# Patient Record
Sex: FEMALE | Race: OTHER | NOT HISPANIC OR LATINO
[De-identification: names, ages, dates, MRNs, and addresses within clinical notes are randomized per-mention and may not be internally consistent; named-entity substitution may affect disease eponyms.]

---
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
## 2021-01-04 ENCOUNTER — APPOINTMENT (OUTPATIENT)
Dept: OBGYN | Facility: CLINIC | Age: 21
End: 2021-01-04
Payer: MEDICAID

## 2021-01-04 VITALS
WEIGHT: 176 LBS | DIASTOLIC BLOOD PRESSURE: 70 MMHG | HEIGHT: 67 IN | SYSTOLIC BLOOD PRESSURE: 110 MMHG | BODY MASS INDEX: 27.62 KG/M2 | TEMPERATURE: 97 F

## 2021-01-04 DIAGNOSIS — Z78.9 OTHER SPECIFIED HEALTH STATUS: ICD-10-CM

## 2021-01-04 DIAGNOSIS — L68.0 HIRSUTISM: ICD-10-CM

## 2021-01-04 DIAGNOSIS — Z82.49 FAMILY HISTORY OF ISCHEMIC HEART DISEASE AND OTHER DISEASES OF THE CIRCULATORY SYSTEM: ICD-10-CM

## 2021-01-04 DIAGNOSIS — Z01.419 ENCOUNTER FOR GYNECOLOGICAL EXAMINATION (GENERAL) (ROUTINE) W/OUT ABNORMAL FINDINGS: ICD-10-CM

## 2021-01-04 PROCEDURE — 99072 ADDL SUPL MATRL&STAF TM PHE: CPT

## 2021-01-04 PROCEDURE — 36415 COLL VENOUS BLD VENIPUNCTURE: CPT

## 2021-01-04 PROCEDURE — 99212 OFFICE O/P EST SF 10 MIN: CPT | Mod: 25

## 2021-01-04 PROCEDURE — 99385 PREV VISIT NEW AGE 18-39: CPT | Mod: 25

## 2021-01-04 RX ORDER — CLINDAMYCIN PHOSPHATE 1 G/10ML
1 GEL TOPICAL
Qty: 30 | Refills: 0 | Status: COMPLETED | COMMUNITY
Start: 2020-09-25

## 2021-01-04 RX ORDER — OXYCODONE AND ACETAMINOPHEN 5; 325 MG/1; MG/1
5-325 TABLET ORAL
Qty: 20 | Refills: 0 | Status: COMPLETED | COMMUNITY
Start: 2020-07-25

## 2021-01-04 RX ORDER — OMEPRAZOLE 40 MG/1
40 CAPSULE, DELAYED RELEASE ORAL
Qty: 30 | Refills: 0 | Status: COMPLETED | COMMUNITY
Start: 2020-08-25

## 2021-01-04 RX ORDER — FLUTICASONE PROPIONATE 50 UG/1
50 SPRAY, METERED NASAL
Qty: 16 | Refills: 0 | Status: COMPLETED | COMMUNITY
Start: 2020-10-07

## 2021-01-04 RX ORDER — URSODIOL 300 MG/1
300 CAPSULE ORAL
Qty: 60 | Refills: 0 | Status: COMPLETED | COMMUNITY
Start: 2020-07-20

## 2021-01-04 RX ORDER — SILVER SULFADIAZINE 10 MG/G
1 CREAM TOPICAL
Qty: 50 | Refills: 0 | Status: COMPLETED | COMMUNITY
Start: 2020-07-20

## 2021-01-04 RX ORDER — ONDANSETRON 4 MG/1
4 TABLET ORAL
Qty: 12 | Refills: 0 | Status: COMPLETED | COMMUNITY
Start: 2020-08-13

## 2021-01-04 NOTE — DISCUSSION/SUMMARY
[FreeTextEntry1] : Hirsutism: will do BW and call with results.\par We discussed OCP to help with painful periods, acne and hirsutism. She is concerned about weight gain. States gained 20lbs the last time she was on ocp. She will consider ocp but not interested at this time.

## 2021-01-04 NOTE — PHYSICAL EXAM
[Appropriately responsive] : appropriately responsive [Alert] : alert [No Acute Distress] : no acute distress [Soft] : soft [Non-tender] : non-tender [Non-distended] : non-distended [No HSM] : No HSM [No Lesions] : no lesions [No Mass] : no mass [Oriented x3] : oriented x3 [Examination Of The Breasts] : a normal appearance [No Masses] : no breast masses were palpable [Labia Majora] : normal [Labia Minora] : normal [Normal] : normal [Uterine Adnexae] : non-palpable [FreeTextEntry6] : hair on chest [Tenderness] : nontender [Enlarged ___ wks] : not enlarged [Mass ___ cm] : no uterine mass was palpated [FreeTextEntry5] : cx obtained

## 2021-01-04 NOTE — HISTORY OF PRESENT ILLNESS
[Currently Active] : currently active [Men] : men [Vaginal] : vaginal [Condoms] : Condoms [TextBox_4] : last exam 2 yrs ago\par Had gastric sleeve in July, lost 80lbs\par Menses have always been regular, menses have been more painful for past 2-3 months.\par C/o +facial hair. Has been going on for years. Did laser in the past. +acne recently, hair on chest.\par SA, uses condoms 95% of the time.

## 2021-01-07 LAB
C TRACH RRNA SPEC QL NAA+PROBE: NOT DETECTED
N GONORRHOEA RRNA SPEC QL NAA+PROBE: NOT DETECTED
SOURCE AMPLIFICATION: NORMAL

## 2021-01-16 LAB
17OHP SERPL-MCNC: 154 NG/DL
ANDROST SERPL-MCNC: 192 NG/DL
DHEA-SULFATE, SERUM: 411 UG/DL
TESTOST BND SERPL-MCNC: 5.1 PG/ML
TESTOST SERPL-MCNC: 37.1 NG/DL

## 2021-08-03 ENCOUNTER — APPOINTMENT (OUTPATIENT)
Dept: OBGYN | Facility: CLINIC | Age: 21
End: 2021-08-03
Payer: MEDICAID

## 2021-08-03 ENCOUNTER — LABORATORY RESULT (OUTPATIENT)
Age: 21
End: 2021-08-03

## 2021-08-03 VITALS
BODY MASS INDEX: 24.33 KG/M2 | DIASTOLIC BLOOD PRESSURE: 78 MMHG | HEIGHT: 67 IN | SYSTOLIC BLOOD PRESSURE: 110 MMHG | WEIGHT: 155 LBS

## 2021-08-03 PROCEDURE — 99072 ADDL SUPL MATRL&STAF TM PHE: CPT

## 2021-08-03 PROCEDURE — 99213 OFFICE O/P EST LOW 20 MIN: CPT

## 2021-08-03 PROCEDURE — 36415 COLL VENOUS BLD VENIPUNCTURE: CPT

## 2021-08-03 PROCEDURE — 81003 URINALYSIS AUTO W/O SCOPE: CPT | Mod: QW

## 2021-08-03 NOTE — HISTORY OF PRESENT ILLNESS
[FreeTextEntry1] : 20 y/o G0 with LMP: 07/20/21 presents for possible vaginal infection. Visited urgent care recently\par \par CC:\par Pt is having burning & itching. Pt states she went to urgent care & was prescribed Diflucan 150 mg x2. Pt took the Diflucan but is not feeling better, Still has yellow-white discharge. \par Pt is requesting all STD testing. \par \par Menstrual Triad: 11 x 28 x 4-5\par \par Last sexually active Early July. Did not use condoms. Was consensual. \par \par Sx:\par gastric bypass 7/24/2020 - lost 100#

## 2021-08-04 LAB
BILIRUB UR QL STRIP: NORMAL
GLUCOSE UR-MCNC: NORMAL
HCG UR QL: 4 EU/DL
HGB UR QL STRIP.AUTO: NORMAL
KETONES UR-MCNC: NORMAL
LEUKOCYTE ESTERASE UR QL STRIP: NORMAL
NITRITE UR QL STRIP: NORMAL
PH UR STRIP: 7.5
PROT UR STRIP-MCNC: NORMAL
SP GR UR STRIP: 1.03

## 2021-08-05 ENCOUNTER — NON-APPOINTMENT (OUTPATIENT)
Age: 21
End: 2021-08-05

## 2021-08-08 LAB
BACTERIA GENITAL AEROBE CULT: ABNORMAL
C TRACH RRNA SPEC QL NAA+PROBE: NOT DETECTED
CANDIDA VAG CYTO: NOT DETECTED
G VAGINALIS+PREV SP MTYP VAG QL MICRO: DETECTED
HBV SURFACE AG SER QL: NONREACTIVE
HCV AB SER QL: NONREACTIVE
HCV S/CO RATIO: 0.19 S/CO
N GONORRHOEA RRNA SPEC QL NAA+PROBE: NOT DETECTED
RPR SER-TITR: NORMAL
SOURCE AMPLIFICATION: NORMAL
T VAGINALIS VAG QL WET PREP: NOT DETECTED

## 2021-09-30 ENCOUNTER — APPOINTMENT (OUTPATIENT)
Dept: OBGYN | Facility: CLINIC | Age: 21
End: 2021-09-30
Payer: MEDICAID

## 2021-09-30 VITALS
WEIGHT: 155 LBS | TEMPERATURE: 97.2 F | DIASTOLIC BLOOD PRESSURE: 70 MMHG | SYSTOLIC BLOOD PRESSURE: 110 MMHG | BODY MASS INDEX: 24.33 KG/M2 | HEIGHT: 67 IN

## 2021-09-30 DIAGNOSIS — B37.3 CANDIDIASIS OF VULVA AND VAGINA: ICD-10-CM

## 2021-09-30 LAB
BILIRUB UR QL STRIP: NORMAL
CLARITY UR: CLEAR
COLLECTION METHOD: NORMAL
GLUCOSE UR-MCNC: NORMAL
HCG UR QL: 0.2 EU/DL
HCG UR QL: NEGATIVE
HGB UR QL STRIP.AUTO: NORMAL
KETONES UR-MCNC: NORMAL
LEUKOCYTE ESTERASE UR QL STRIP: NORMAL
NITRITE UR QL STRIP: NORMAL
PH UR STRIP: 6
PROT UR STRIP-MCNC: NORMAL
QUALITY CONTROL: YES
SP GR UR STRIP: 1.03

## 2021-09-30 PROCEDURE — 81003 URINALYSIS AUTO W/O SCOPE: CPT | Mod: QW

## 2021-09-30 PROCEDURE — 99072 ADDL SUPL MATRL&STAF TM PHE: CPT

## 2021-09-30 PROCEDURE — 81025 URINE PREGNANCY TEST: CPT

## 2021-09-30 PROCEDURE — 99213 OFFICE O/P EST LOW 20 MIN: CPT

## 2021-09-30 NOTE — HISTORY OF PRESENT ILLNESS
[FreeTextEntry1] : Pt c/o burning after urination, feels like she has a cut, yellow, whitish dc.\par Was given diflucan 1 week ago which helped a little but then symptoms came back.\par No itching, no odor.\par No frequency or urgency.\par 1 week ago SA and did not use a condom. Desires std screen.

## 2021-09-30 NOTE — PHYSICAL EXAM
[Appropriately responsive] : appropriately responsive [Alert] : alert [No Acute Distress] : no acute distress [Labia Majora] : normal [Labia Majora Erythema] : erythema [Labia Minora] : normal [Labia Minora Erythema] : erythema [Erythematous] : erythema [Normal] : normal

## 2021-10-08 LAB
A VAGINAE DNA VAG QL NAA+PROBE: ABNORMAL
BVAB2 DNA VAG QL NAA+PROBE: NORMAL
C KRUSEI DNA VAG QL NAA+PROBE: NEGATIVE
C TRACH RRNA SPEC QL NAA+PROBE: NEGATIVE
MEGA1 DNA VAG QL NAA+PROBE: ABNORMAL
N GONORRHOEA RRNA SPEC QL NAA+PROBE: NEGATIVE
T VAGINALIS RRNA SPEC QL NAA+PROBE: NEGATIVE

## 2021-12-16 ENCOUNTER — NON-APPOINTMENT (OUTPATIENT)
Age: 21
End: 2021-12-16

## 2022-01-04 ENCOUNTER — APPOINTMENT (OUTPATIENT)
Dept: OBGYN | Facility: CLINIC | Age: 22
End: 2022-01-04
Payer: MEDICAID

## 2022-01-04 VITALS
DIASTOLIC BLOOD PRESSURE: 72 MMHG | BODY MASS INDEX: 24.48 KG/M2 | HEIGHT: 67 IN | SYSTOLIC BLOOD PRESSURE: 110 MMHG | RESPIRATION RATE: 18 BRPM | OXYGEN SATURATION: 98 % | WEIGHT: 156 LBS

## 2022-01-04 DIAGNOSIS — N76.0 ACUTE VAGINITIS: ICD-10-CM

## 2022-01-04 PROCEDURE — 99072 ADDL SUPL MATRL&STAF TM PHE: CPT

## 2022-01-04 PROCEDURE — 99214 OFFICE O/P EST MOD 30 MIN: CPT

## 2022-01-04 RX ORDER — CLINDAMYCIN HYDROCHLORIDE 300 MG/1
300 CAPSULE ORAL 3 TIMES DAILY
Qty: 18 | Refills: 0 | Status: DISCONTINUED | COMMUNITY
Start: 2021-08-05 | End: 2022-01-04

## 2022-01-04 RX ORDER — TERCONAZOLE 8 MG/G
0.8 CREAM VAGINAL
Qty: 1 | Refills: 1 | Status: DISCONTINUED | COMMUNITY
Start: 2021-09-30 | End: 2022-01-04

## 2022-01-04 RX ORDER — METRONIDAZOLE 7.5 MG/G
0.75 GEL VAGINAL
Qty: 1 | Refills: 0 | Status: DISCONTINUED | COMMUNITY
Start: 2021-12-16 | End: 2022-01-04

## 2022-01-04 RX ORDER — FLUCONAZOLE 150 MG/1
150 TABLET ORAL
Qty: 2 | Refills: 0 | Status: DISCONTINUED | COMMUNITY
Start: 2021-09-20 | End: 2022-01-04

## 2022-01-04 NOTE — DISCUSSION/SUMMARY
[FreeTextEntry1] : 1) affirm/STD cultures obtained\par 2) patient treated presumptively for yeast. Pt advised this is possibly 2nd to Metrogel therapy\par 3) condom usage advised\par \par Return to office prn

## 2022-01-04 NOTE — PHYSICAL EXAM
The patient is a 22y Male complaining of pain, shoulder. [Appropriately responsive] : appropriately responsive [Alert] : alert [No Acute Distress] : no acute distress [No Lymphadenopathy] : no lymphadenopathy [Oriented x3] : oriented x3 [Labia Majora] : normal [Labia Minora] : normal [Normal] : normal [FreeTextEntry4] : copious yellow curdy discharge

## 2022-01-05 ENCOUNTER — NON-APPOINTMENT (OUTPATIENT)
Age: 22
End: 2022-01-05

## 2022-01-05 LAB
C TRACH RRNA SPEC QL NAA+PROBE: NOT DETECTED
CANDIDA VAG CYTO: DETECTED
G VAGINALIS+PREV SP MTYP VAG QL MICRO: DETECTED
N GONORRHOEA RRNA SPEC QL NAA+PROBE: NOT DETECTED
SOURCE AMPLIFICATION: NORMAL
T VAGINALIS VAG QL WET PREP: NOT DETECTED

## 2022-01-19 ENCOUNTER — NON-APPOINTMENT (OUTPATIENT)
Age: 22
End: 2022-01-19

## 2022-04-26 ENCOUNTER — APPOINTMENT (OUTPATIENT)
Dept: OBGYN | Facility: CLINIC | Age: 22
End: 2022-04-26

## 2022-04-29 ENCOUNTER — APPOINTMENT (OUTPATIENT)
Dept: OBGYN | Facility: CLINIC | Age: 22
End: 2022-04-29

## 2022-05-02 ENCOUNTER — APPOINTMENT (OUTPATIENT)
Dept: OBGYN | Facility: CLINIC | Age: 22
End: 2022-05-02
Payer: MEDICAID

## 2022-05-02 ENCOUNTER — TRANSCRIPTION ENCOUNTER (OUTPATIENT)
Age: 22
End: 2022-05-02

## 2022-05-02 VITALS — DIASTOLIC BLOOD PRESSURE: 70 MMHG | SYSTOLIC BLOOD PRESSURE: 90 MMHG

## 2022-05-02 DIAGNOSIS — Z01.411 ENCOUNTER FOR GYNECOLOGICAL EXAMINATION (GENERAL) (ROUTINE) WITH ABNORMAL FINDINGS: ICD-10-CM

## 2022-05-02 DIAGNOSIS — Z23 ENCOUNTER FOR IMMUNIZATION: ICD-10-CM

## 2022-05-02 DIAGNOSIS — B96.89 ACUTE VAGINITIS: ICD-10-CM

## 2022-05-02 DIAGNOSIS — N76.0 ACUTE VAGINITIS: ICD-10-CM

## 2022-05-02 DIAGNOSIS — Z72.51 HIGH RISK HETEROSEXUAL BEHAVIOR: ICD-10-CM

## 2022-05-02 DIAGNOSIS — Z11.3 ENCOUNTER FOR SCREENING FOR INFECTIONS WITH A PREDOMINANTLY SEXUAL MODE OF TRANSMISSION: ICD-10-CM

## 2022-05-02 DIAGNOSIS — Z87.898 PERSONAL HISTORY OF OTHER SPECIFIED CONDITIONS: ICD-10-CM

## 2022-05-02 DIAGNOSIS — R39.9 UNSPECIFIED SYMPTOMS AND SIGNS INVOLVING THE GENITOURINARY SYSTEM: ICD-10-CM

## 2022-05-02 LAB
HCG UR QL: NEGATIVE
QUALITY CONTROL: YES

## 2022-05-02 PROCEDURE — 99395 PREV VISIT EST AGE 18-39: CPT

## 2022-05-02 PROCEDURE — 81025 URINE PREGNANCY TEST: CPT

## 2022-05-02 RX ORDER — METRONIDAZOLE 500 MG/1
500 TABLET ORAL TWICE DAILY
Qty: 14 | Refills: 0 | Status: DISCONTINUED | COMMUNITY
Start: 2022-01-05 | End: 2022-05-02

## 2022-05-02 RX ORDER — FLUCONAZOLE 150 MG/1
150 TABLET ORAL
Qty: 2 | Refills: 0 | Status: DISCONTINUED | COMMUNITY
Start: 2022-01-04 | End: 2022-05-02

## 2022-05-02 RX ORDER — CLOTRIMAZOLE AND BETAMETHASONE DIPROPIONATE 10; .5 MG/G; MG/G
1-0.05 CREAM TOPICAL TWICE DAILY
Qty: 1 | Refills: 0 | Status: DISCONTINUED | COMMUNITY
Start: 2022-01-04 | End: 2022-05-02

## 2022-05-02 RX ORDER — METRONIDAZOLE 7.5 MG/G
0.75 GEL VAGINAL
Qty: 1 | Refills: 0 | Status: DISCONTINUED | COMMUNITY
Start: 2022-01-19 | End: 2022-05-02

## 2022-05-02 NOTE — DISCUSSION/SUMMARY
[FreeTextEntry1] : 1) pap/STD cultures obtained\par 2) pt with no contraindications for OCP usage. risks/benefits/side effects reviewed along with optimal initiation and usage. pt advised to take a pregnancy test if menses is very short or spotting only or anything out of the usual - prior to starting OCPS. in-office pregnancy test is negative today but this may be too early.\par 3) pt advised to obtain Gardasil vaccine if not already received. written literature provided\par 4) consistent condom usage advised\par \par Return to office for surveillance of OCPS in 6 months, sooner prn\par

## 2022-05-02 NOTE — HISTORY OF PRESENT ILLNESS
[Currently Active] : currently active [Men] : men [No] : No [TextBox_4] : Oksana is a 23 y/o G0 who presents today for an annual exam with request for STD testing. She also desires to start hormonal contraception for relief of heavy, painful menses. She is currently sexually active and not using any method of contraception.  She is concerned about weight gain as she is s/p gastric sleeve surgery.\par \par She is unsure if she ever had the Gardasil vaccine.   [FreeTextEntry1] : 4/10/22 [FreeTextEntry4] : She is unsure if she ever had the Gardasil vaccine

## 2022-05-03 LAB
C TRACH RRNA SPEC QL NAA+PROBE: NOT DETECTED
HAV IGM SER QL: NONREACTIVE
HBV CORE IGM SER QL: NONREACTIVE
HBV SURFACE AG SER QL: NONREACTIVE
HCV AB SER QL: NONREACTIVE
HCV S/CO RATIO: 0.17 S/CO
HIV1+2 AB SPEC QL IA.RAPID: NONREACTIVE
N GONORRHOEA RRNA SPEC QL NAA+PROBE: NOT DETECTED
SOURCE AMPLIFICATION: NORMAL
SOURCE TP AMPLIFICATION: NORMAL
T PALLIDUM AB SER QL IA: NEGATIVE
T VAGINALIS RRNA SPEC QL NAA+PROBE: NOT DETECTED

## 2022-05-04 ENCOUNTER — NON-APPOINTMENT (OUTPATIENT)
Age: 22
End: 2022-05-04

## 2022-05-04 LAB
HSV 1+2 IGG SER IA-IMP: NEGATIVE
HSV 1+2 IGG SER IA-IMP: POSITIVE
HSV1 IGG SER QL: 49.9 INDEX
HSV2 IGG SER QL: 0.45 INDEX

## 2022-05-12 ENCOUNTER — NON-APPOINTMENT (OUTPATIENT)
Age: 22
End: 2022-05-12

## 2022-08-23 ENCOUNTER — APPOINTMENT (OUTPATIENT)
Dept: ORTHOPEDIC SURGERY | Facility: CLINIC | Age: 22
End: 2022-08-23

## 2022-08-23 VITALS — BODY MASS INDEX: 24.48 KG/M2 | WEIGHT: 156 LBS | HEIGHT: 67 IN

## 2022-08-23 PROCEDURE — 72040 X-RAY EXAM NECK SPINE 2-3 VW: CPT

## 2022-08-23 PROCEDURE — 99204 OFFICE O/P NEW MOD 45 MIN: CPT

## 2022-08-31 ENCOUNTER — NON-APPOINTMENT (OUTPATIENT)
Age: 22
End: 2022-08-31

## 2022-09-20 ENCOUNTER — APPOINTMENT (OUTPATIENT)
Dept: OBGYN | Facility: CLINIC | Age: 22
End: 2022-09-20

## 2022-10-03 ENCOUNTER — APPOINTMENT (OUTPATIENT)
Dept: OBGYN | Facility: CLINIC | Age: 22
End: 2022-10-03

## 2022-10-03 VITALS
SYSTOLIC BLOOD PRESSURE: 110 MMHG | DIASTOLIC BLOOD PRESSURE: 76 MMHG | HEIGHT: 67 IN | WEIGHT: 156 LBS | BODY MASS INDEX: 24.48 KG/M2

## 2022-10-03 LAB
BILIRUB UR QL STRIP: NORMAL
CLARITY UR: NORMAL
COLLECTION METHOD: NORMAL
GLUCOSE UR-MCNC: NORMAL
HCG UR QL: 0.2 EU/DL
HGB UR QL STRIP.AUTO: NORMAL
KETONES UR-MCNC: NORMAL
LEUKOCYTE ESTERASE UR QL STRIP: NORMAL
NITRITE UR QL STRIP: NORMAL
PH UR STRIP: 7.5
PROT UR STRIP-MCNC: NORMAL
SP GR UR STRIP: 1.02

## 2022-10-03 PROCEDURE — 99213 OFFICE O/P EST LOW 20 MIN: CPT

## 2022-10-03 PROCEDURE — 81003 URINALYSIS AUTO W/O SCOPE: CPT | Mod: QW

## 2022-10-03 NOTE — HISTORY OF PRESENT ILLNESS
[FreeTextEntry1] : pt had annual with pap 5/2022, on ocp now\par \par \par C/o itching and burning in vagina since last night, whitish yellow dc, odor, states slightly fishy for past month\par no urinary symptoms\par No recent abx

## 2022-10-03 NOTE — PHYSICAL EXAM
[Appropriately responsive] : appropriately responsive [Alert] : alert [No Acute Distress] : no acute distress [Oriented x3] : oriented x3 [Labia Majora] : normal [Labia Majora Erythema] : erythema [Labia Minora] : normal [Labia Minora Erythema] : erythema [Erythematous] : erythema [Normal] : normal [FreeTextEntry4] : cx done

## 2022-10-06 ENCOUNTER — TRANSCRIPTION ENCOUNTER (OUTPATIENT)
Age: 22
End: 2022-10-06

## 2022-10-06 LAB
CANDIDA VAG CYTO: NOT DETECTED
G VAGINALIS+PREV SP MTYP VAG QL MICRO: DETECTED
T VAGINALIS VAG QL WET PREP: NOT DETECTED

## 2022-10-28 ENCOUNTER — NON-APPOINTMENT (OUTPATIENT)
Age: 22
End: 2022-10-28

## 2022-11-27 ENCOUNTER — NON-APPOINTMENT (OUTPATIENT)
Age: 22
End: 2022-11-27

## 2023-05-15 ENCOUNTER — NON-APPOINTMENT (OUTPATIENT)
Age: 23
End: 2023-05-15

## 2023-06-21 ENCOUNTER — APPOINTMENT (OUTPATIENT)
Dept: OBGYN | Facility: CLINIC | Age: 23
End: 2023-06-21
Payer: MEDICAID

## 2023-06-21 VITALS
WEIGHT: 156 LBS | HEIGHT: 67 IN | DIASTOLIC BLOOD PRESSURE: 70 MMHG | BODY MASS INDEX: 24.48 KG/M2 | SYSTOLIC BLOOD PRESSURE: 110 MMHG

## 2023-06-21 DIAGNOSIS — Z87.42 PERSONAL HISTORY OF OTHER DISEASES OF THE FEMALE GENITAL TRACT: ICD-10-CM

## 2023-06-21 DIAGNOSIS — Z12.4 ENCOUNTER FOR SCREENING FOR MALIGNANT NEOPLASM OF CERVIX: ICD-10-CM

## 2023-06-21 DIAGNOSIS — Z30.09 ENCOUNTER FOR OTHER GENERAL COUNSELING AND ADVICE ON CONTRACEPTION: ICD-10-CM

## 2023-06-21 DIAGNOSIS — N92.0 EXCESSIVE AND FREQUENT MENSTRUATION WITH REGULAR CYCLE: ICD-10-CM

## 2023-06-21 DIAGNOSIS — G43.909 MIGRAINE, UNSPECIFIED, NOT INTRACTABLE, W/OUT STATUS MIGRAINOSUS: ICD-10-CM

## 2023-06-21 DIAGNOSIS — Z01.419 ENCOUNTER FOR GYNECOLOGICAL EXAMINATION (GENERAL) (ROUTINE) W/OUT ABNORMAL FINDINGS: ICD-10-CM

## 2023-06-21 DIAGNOSIS — J32.9 CHRONIC SINUSITIS, UNSPECIFIED: ICD-10-CM

## 2023-06-21 LAB — HCG UR QL: NEGATIVE

## 2023-06-21 PROCEDURE — 81025 URINE PREGNANCY TEST: CPT

## 2023-06-21 PROCEDURE — 99395 PREV VISIT EST AGE 18-39: CPT

## 2023-06-21 RX ORDER — METRONIDAZOLE 7.5 MG/G
0.75 GEL VAGINAL
Qty: 1 | Refills: 0 | Status: DISCONTINUED | COMMUNITY
Start: 2022-10-06 | End: 2023-06-21

## 2023-06-21 RX ORDER — NORETHINDRONE ACETATE AND ETHINYL ESTRADIOL AND FERROUS FUMARATE 1MG-20(21)
1-20 KIT ORAL DAILY
Qty: 3 | Refills: 3 | Status: DISCONTINUED | COMMUNITY
Start: 2022-05-02 | End: 2023-06-21

## 2023-06-21 RX ORDER — IBUPROFEN 400 MG/1
400 TABLET, FILM COATED ORAL
Qty: 28 | Refills: 0 | Status: DISCONTINUED | COMMUNITY
Start: 2022-08-17 | End: 2023-06-21

## 2023-06-21 RX ORDER — CYCLOBENZAPRINE HYDROCHLORIDE 5 MG/1
5 TABLET, FILM COATED ORAL
Qty: 90 | Refills: 1 | Status: DISCONTINUED | COMMUNITY
Start: 2022-08-23 | End: 2023-06-21

## 2023-06-21 RX ORDER — CYCLOBENZAPRINE HYDROCHLORIDE 10 MG/1
10 TABLET, FILM COATED ORAL
Qty: 9 | Refills: 0 | Status: DISCONTINUED | COMMUNITY
Start: 2022-08-17 | End: 2023-06-21

## 2023-06-21 RX ORDER — GABAPENTIN 100 MG/1
100 CAPSULE ORAL TWICE DAILY
Qty: 60 | Refills: 0 | Status: DISCONTINUED | COMMUNITY
Start: 2022-08-23 | End: 2023-06-21

## 2023-06-21 RX ORDER — FLUCONAZOLE 150 MG/1
150 TABLET ORAL
Qty: 2 | Refills: 0 | Status: DISCONTINUED | COMMUNITY
Start: 2022-10-03 | End: 2023-06-21

## 2023-06-21 NOTE — HISTORY OF PRESENT ILLNESS
[Previously active] : previously active [Men] : men [TextBox_4] : Oksana is a 22 y/o G0 who presents today for an annual exam.\par \par She states she had restarted her OCPs 4/16/23, had a menses 5/3/23 but nothing since and she did not skip the placebo pills.  She was previously sexually active but not currently. No condom usage. Her in-office pregnancy test is negative today.\par \par She states that since gastric sleeve surgery 3 years ago menses have been very painful.  She also states that for the past two months she has been having migraines everyday. She does not recall having them with prior usage of OCPS.  She denies any visual changes prior.\par Her 2022 pap was LSIL. [FreeTextEntry1] : 5/3/23 [FreeTextEntry3] : OCPs intermittently

## 2023-06-21 NOTE — DISCUSSION/SUMMARY
[FreeTextEntry1] : 1) pap/std cultures performed\par 2) pt with consistent migraines since re-starting combined hormonal contraception. Discussed trialing progesterone only pills to see if this stops migraines. Advised to complete remaining 4 days of current pack and then to start Norethindrone. If migrained persist, pt advised to f/u with neurologist\par \par Return to office in 6 months for POP surveillance, sooner prn

## 2023-06-22 LAB
C TRACH RRNA SPEC QL NAA+PROBE: NOT DETECTED
N GONORRHOEA RRNA SPEC QL NAA+PROBE: NOT DETECTED
SOURCE TP AMPLIFICATION: NORMAL

## 2023-06-23 LAB — CYTOLOGY CVX/VAG DOC THIN PREP: NORMAL

## 2023-07-26 ENCOUNTER — APPOINTMENT (OUTPATIENT)
Dept: ORTHOPEDIC SURGERY | Facility: CLINIC | Age: 23
End: 2023-07-26
Payer: MEDICAID

## 2023-07-26 VITALS — HEIGHT: 67 IN | WEIGHT: 157 LBS | BODY MASS INDEX: 24.64 KG/M2

## 2023-07-26 DIAGNOSIS — S13.9XXA SPRAIN OF JOINTS AND LIGAMENTS OF UNSPECIFIED PARTS OF NECK, INITIAL ENCOUNTER: ICD-10-CM

## 2023-07-26 PROCEDURE — 99215 OFFICE O/P EST HI 40 MIN: CPT

## 2023-07-26 NOTE — PHYSICAL EXAM
[de-identified] : She is fully alert and oriented with a normal mood and affect.  She is in no acute distress as I take the history.  She ambulates with a normal gait including tiptoe and heel walking.  There is no evidence of unsteadiness or spasticity.  There are no cutaneous abnormalities of the spine.  There is a mild shoulder height discrepancy from a mild scoliosis.  There is no evidence of shortness of breath or respiratory distress.  A lower extremity neurological examination revealed 1-2+ symmetrical reflexes.  Toes are downgoing.  Motor power is normal to manual testing in all lower extremity groups and sensation is normal to light touch in all dermatomes.  Straight leg raising is negative to 90 degrees in the sitting position bilaterally.  Her hips and her knees have a full and painless range of motion with normal stability.  Vascular examination shows no evidence of varicosities there is no lymphedema.  Her upper extremity neurological examination revealed 1+ symmetrical reflexes.  Motor power is normal to manual testing in all upper extremity groups and sensation is normal to light touch in all dermatomes.  Shoulder range of motion is full, painless and symmetrical.  Range of motion of the cervical spine is mildly limited.  She has flexion with the chin reaching to within 3 fingerbreadths of the chest, extension of 70 degrees with rotation of 70 degrees bilaterally and tilt of 30 degrees bilaterally with some discomfort at the extremes. [de-identified] : I reviewed her x-rays of last year that show a straightening of the normal cervical lordosis without destructive changes.

## 2023-07-26 NOTE — DISCUSSION/SUMMARY
[Medication Risks Reviewed] : Medication risks reviewed [de-identified] : She will use moist heat and refrain from any upper extremity weight lifting activities.  We discussed other daily activities she needs to avoid which may aggravate the symptoms.  She will start on a trial of meloxicam 15 mg once a day as a has a much lower incidence of GI side effects.  She will call if there are problems with the medication or worsening of her symptoms and I will see her for follow-up in 4 weeks.

## 2023-07-26 NOTE — HISTORY OF PRESENT ILLNESS
[de-identified] : This 23-year-old woman who works as a  saw Dr. Das last August for neck related symptoms.  She relates today that the neck symptoms have persisted with radiation into the upper back and associated with headaches.  The pain does not radiate to her heart her arms and she has not had associated neurologic symptoms in the arms of numbness, paresthesias or weakness.  There is no Valsalva effect.  She does get occasional night pain.  There have been no changes in her gait or balance and she has not had problems with hand function.  She does have some seemingly innocent daily habits that may well aggravate and propagate neck related pain.\par \par She has previously had a T&A.  3 years ago she had a gastric sleeve procedure and she has lost 120 pounds.  She has also had surgery for a pilonidal cyst.  She has been on omeprazole since her gastric sleeve procedure and has not had any gastric symptoms.  She has a penicillin allergy.  She does exercise regularly. [Pain Location] : pain [7] : a maximum pain level of 7/10

## 2023-08-07 ENCOUNTER — APPOINTMENT (OUTPATIENT)
Dept: ORTHOPEDIC SURGERY | Facility: CLINIC | Age: 23
End: 2023-08-07

## 2023-09-06 ENCOUNTER — APPOINTMENT (OUTPATIENT)
Dept: ORTHOPEDIC SURGERY | Facility: CLINIC | Age: 23
End: 2023-09-06

## 2023-09-13 ENCOUNTER — APPOINTMENT (OUTPATIENT)
Dept: OBGYN | Facility: CLINIC | Age: 23
End: 2023-09-13
Payer: MEDICAID

## 2023-09-13 VITALS
DIASTOLIC BLOOD PRESSURE: 70 MMHG | WEIGHT: 162 LBS | SYSTOLIC BLOOD PRESSURE: 120 MMHG | BODY MASS INDEX: 25.43 KG/M2 | HEIGHT: 67 IN

## 2023-09-13 DIAGNOSIS — A60.00 HERPESVIRAL INFECTION OF UROGENITAL SYSTEM, UNSPECIFIED: ICD-10-CM

## 2023-09-13 PROCEDURE — 99214 OFFICE O/P EST MOD 30 MIN: CPT

## 2023-09-15 ENCOUNTER — EMERGENCY (EMERGENCY)
Facility: HOSPITAL | Age: 23
LOS: 0 days | Discharge: HOME | End: 2023-09-15
Attending: STUDENT IN AN ORGANIZED HEALTH CARE EDUCATION/TRAINING PROGRAM
Payer: OTHER MISCELLANEOUS

## 2023-09-15 ENCOUNTER — NON-APPOINTMENT (OUTPATIENT)
Age: 23
End: 2023-09-15

## 2023-09-15 VITALS
DIASTOLIC BLOOD PRESSURE: 81 MMHG | WEIGHT: 163.58 LBS | RESPIRATION RATE: 16 BRPM | SYSTOLIC BLOOD PRESSURE: 112 MMHG | OXYGEN SATURATION: 100 % | HEART RATE: 58 BPM | TEMPERATURE: 98 F

## 2023-09-15 VITALS — HEIGHT: 67 IN | WEIGHT: 164.91 LBS

## 2023-09-15 DIAGNOSIS — Z90.89 ACQUIRED ABSENCE OF OTHER ORGANS: Chronic | ICD-10-CM
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
## 2023-09-15 DIAGNOSIS — S01.85XA OPEN BITE OF OTHER PART OF HEAD, INITIAL ENCOUNTER: ICD-10-CM
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
## 2023-09-15 DIAGNOSIS — Z88.0 ALLERGY STATUS TO PENICILLIN: ICD-10-CM

## 2023-09-15 LAB
C TRACH RRNA SPEC QL NAA+PROBE: NOT DETECTED
CANDIDA VAG CYTO: NOT DETECTED
G VAGINALIS+PREV SP MTYP VAG QL MICRO: DETECTED
N GONORRHOEA RRNA SPEC QL NAA+PROBE: NOT DETECTED
SOURCE AMPLIFICATION: NORMAL
T VAGINALIS VAG QL WET PREP: NOT DETECTED

## 2023-09-15 PROCEDURE — 99284 EMERGENCY DEPT VISIT MOD MDM: CPT

## 2023-09-15 PROCEDURE — 99283 EMERGENCY DEPT VISIT LOW MDM: CPT

## 2023-09-15 RX ORDER — METRONIDAZOLE 500 MG
1 TABLET ORAL
Qty: 21 | Refills: 0
Start: 2023-09-15 | End: 2023-09-21

## 2023-09-15 RX ORDER — HUMAN RABIES VIRUS IMMUNE GLOBULIN 150 [IU]/ML
1500 INJECTION, SOLUTION INTRAMUSCULAR ONCE
Refills: 0 | Status: DISCONTINUED | OUTPATIENT
Start: 2023-09-15 | End: 2023-09-15

## 2023-09-15 RX ORDER — RABIES VACC, HUMAN DIPLOID/PF 2.5 UNIT
1 VIAL (EA) INTRAMUSCULAR ONCE
Refills: 0 | Status: DISCONTINUED | OUTPATIENT
Start: 2023-09-15 | End: 2023-09-15

## 2023-09-15 RX ADMIN — Medication 1 TABLET(S): at 15:20

## 2023-09-15 NOTE — ED STATDOCS - NS ED ATTENDING STATEMENT MOD
This was a shared visit with the TADEO. I reviewed and verified the documentation and independently performed the documented:

## 2023-09-15 NOTE — ED STATDOCS - OBJECTIVE STATEMENT
24 y/o female with no pertinent PMHx presents to ED s/p dog bite to left jaw. 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 Reports dog latched onto her. Was sent by urgent care for further evaluation. Tetanus is UTD. Allergies: Penicillin.

## 2023-09-15 NOTE — ED STATDOCS - NS_EDPROVIDERDISPOUSERTYPE_ED_A_ED
Scribe Attestation (For Scribes USE Only)... Attending Attestation (For Attendings USE Only).../Scribe Attestation (For Scribes USE Only)... 20

## 2023-09-15 NOTE — ED STATDOCS - PHYSICAL EXAMINATION
GENERAL: non-toxic appearing, in NAD  HEAD: atraumatic, normocephalic  EYES: vision grossly intact, no conjunctivitis or discharge  EARS: hearing grossly intact  NOSE: no nasal discharge, epistaxis   CARDIAC: RRR, normal S1S2,  no appreciable murmurs, no cyanosis, cap refill < 2 seconds  PULM: no respiratory distress, oxygen saturation on RA wnl, CTAB, no crackles, rales, rhonchi, or wheezing  GI: abdomen nondistended, soft, nontender, no guarding or rebound tenderness, no palpable masses  NEURO: awake and alert, follows commands, normal speech, PERRLA, EOMI, no focal motor or sensory deficits, normal gait  MSK: spine appears normal, no joint swelling or erythema, no gross deformities of extremities  EXT: no peripheral edema, calf tenderness, redness or swelling  SKIN: +2 puncture wounds to left lower jaw   PSYCH: appropriate mood and affect

## 2023-09-15 NOTE — ED STATDOCS - NSFOLLOWUPINSTRUCTIONS_ED_ALL_ED_FT
Take the antibiotics as directed.   Keep the wound clean with soap and water.   Return to the Emergency Department for worsening or persistent symptoms, and/or ANY NEW OR CONCERNING SYMPTOMS. If you have issues obtaining follow up, please call: [phone] or [phone]  to obtain a doctor or specialist who takes your insurance in your area.        Animal Bite, Adult  Animal bites range from mild to serious. An animal bite can result in any of these injuries:  A scratch.  A deep, open cut.  Broken (punctured) or torn skin.  A crush injury.  A bone injury.  A small bite from a house pet is usually less serious than a bite from a stray or wild animal. Cat bites can be more serious because their long, thin teeth can cause deep puncture wounds that close fast, trapping bacteria inside.    Stray or wild animals, such as a raccoon, frausto, skunk, or bat, are at higher risk of carrying a serious infection called rabies, which they can pass to a human through a bite. A bite from one of these animals needs medical care right away and, sometimes, rabies vaccination.    What increases the risk?  You are more likely to be bitten by an animal if:  You are around unfamiliar pets.  You disturb an animal when it is eating, sleeping, or caring for its babies.  You are outdoors in a place where small, wild animals roam freely.  What are the signs or symptoms?  Common symptoms of an animal bite include:  Pain.  Bleeding.  Swelling.  Bruising.  How is this diagnosed?  This condition may be diagnosed based on a physical exam and medical history. Your health care provider will examine your wound and ask for details about the animal and how the bite happened. You may also have tests, such as:  Blood tests to check for infection.  X-rays to check for damage to bones or joints.  Taking a fluid sample from your wound and checking it for infection (culture test).  How is this treated?  Treatment depends on the type of animal, where the bite is on your body, and your medical history. Treatment may include:  Wound care. This often includes cleaning the wound and rinsing it out (flushing it) with saline solution, which is made of salt and water. A bandage (dressing) is also often applied. In rare cases, the wound may be closed with stitches (sutures), staples, skin glue, or adhesive strips.  Antibiotic medicine to prevent or treat infection. This medicine may be prescribed in pill or ointment form. If the bite area gets infected, the medicine may be given through an IV.  A tetanus shot to prevent tetanus infection.  Rabies treatment to prevent rabies infection, if the animal could have rabies.  Surgery. This may be done if a bite gets infected or causes damage that needs to be repaired.  Follow these instructions at home:  Medicines    Take or apply over-the-counter and prescription medicines only as told by your health care provider.  If you were prescribed an antibiotic medicine, take or apply it as told by your health care provider. Do not stop using the antibiotic even if you start to feel better.  Wound care    Two stitched wounds. One is normal. The other is red with pus and infected.  Follow instructions from your health care provider about how to take care of your wound. Make sure you:  Wash your hands with soap and water for at least 20 seconds before and after you change your dressing. If soap and water are not available, use hand .  Change your dressing as told by your health care provider.  Leave sutures, skin glue, or adhesive strips in place. These skin closures may need to stay in place for 2 weeks or longer. If adhesive strip edges start to loosen and curl up, you may trim the loose edges. Do not remove adhesive strips completely unless your health care provider tells you to do that.  Check your wound every day for signs of infection. Check for:  More redness, swelling, or pain.  More fluid or blood.  Warmth.  Pus or a bad smell.  General instructions    Bag of ice on a towel on the skin.   Raise (elevate) the injured area above the level of your heart while you are sitting or lying down, if this is possible.  If directed, put ice on the injured area. To do this:  Put ice in a plastic bag.  Place a towel between your skin and the bag.  Leave the ice on for 20 minutes, 2–3 times per day.  Remove the ice if your skin turns bright red. This is very important. If you cannot feel pain, heat, or cold, you have a greater risk of damage to the area.  Keep all follow-up visits. This is important.  Contact a health care provider if:  You have more redness, swelling, or pain around your wound.  Your wound feels warm to the touch.  You have a fever or chills.  You have a general feeling of sickness (malaise).  You feel nauseous or you vomit.  You have pain that does not get better.  Get help right away if:  You have a red streak that leads away from your wound.  You have non-clear fluid or more blood coming from your wound.  There is pus or a bad smell coming from your wound.  You have trouble moving your injured area.  You have numbness or tingling that spreads beyond your wound.  Summary  Animal bites can range from mild to serious. An animal bite can cause a scratch on the skin, a deep and open cut, torn or punctured skin, a crush injury, or a bone injury.  A bite from a stray or wild animal needs medical care right away and, sometimes, rabies vaccination.  Your health care provider will examine your wound and ask for details about the animal and how the bite happened.  Treatment may include wound care, antibiotic medicine, a tetanus shot, and rabies treatment if the animal could have rabies.

## 2023-09-15 NOTE — ED STATDOCS - CLINICAL SUMMARY MEDICAL DECISION MAKING FREE TEXT BOX
History and physical as documented above.  No indication for laceration repair. 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

## 2023-09-15 NOTE — ED STATDOCS - DISPOSITION TYPE
Outreach call placed to patient's caregivers to coordinate a return visit with pre-visit lab draw. A voicemail was left with a request for a return call.    pain, rectal DISCHARGE

## 2023-09-15 NOTE — ED STATDOCS - PATIENT PORTAL LINK FT
You can access the FollowMyHealth Patient Portal offered by [facility] by registering at the following website: [email or web address]. By joining [facility] FollowMyHealth portal, you will also be able to view your health information using other applications (apps) compatible with our system.

## 2023-09-15 NOTE — ED STATDOCS - PROGRESS NOTE DETAILS
Initially spoke with Rebekah from [facility] who said since the vaccine of the dog was being updated then the pt should get it.   Received a call from Fatmata from [facility] who said that they don't go based on vaccine status and that as long as the dog can be observed then she does not need the rabies series or vaccines. Pt was made aware of the plan and agrees. 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 Cleaned wond with betadine ad applied bacitracin. -Mekhi Pickens PA-C 24 yo female presents with L jaw dog bite. Pt works at [employer] and was hold a dog that was getting his yearly rabies vaccine, who jumped and bit the pt. Pt's vaccines are UTD.   2 puncture wound to the L jaw. No need for repair wince wound are small and to prvent infection. Will reach out to the [facility] for possible rabies vaccines if needed. -Mekhi Pickens PA-C

## 2023-09-15 NOTE — ED ADULT TRIAGE NOTE - CHIEF COMPLAINT QUOTE
patient c/o dog bite to left jaw.  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  sent by urgent care.  patients last tetanus shot was a few months ago.

## 2023-10-04 ENCOUNTER — APPOINTMENT (OUTPATIENT)
Dept: ORTHOPEDIC SURGERY | Facility: CLINIC | Age: 23
End: 2023-10-04
Payer: MEDICAID

## 2023-10-04 ENCOUNTER — NON-APPOINTMENT (OUTPATIENT)
Age: 23
End: 2023-10-04

## 2023-10-04 VITALS
WEIGHT: 162 LBS | HEIGHT: 67 IN | DIASTOLIC BLOOD PRESSURE: 74 MMHG | HEART RATE: 66 BPM | BODY MASS INDEX: 25.43 KG/M2 | SYSTOLIC BLOOD PRESSURE: 111 MMHG

## 2023-10-04 DIAGNOSIS — M25.531 PAIN IN RIGHT WRIST: ICD-10-CM

## 2023-10-04 PROCEDURE — 99214 OFFICE O/P EST MOD 30 MIN: CPT

## 2023-10-04 PROCEDURE — 73110 X-RAY EXAM OF WRIST: CPT | Mod: RT

## 2023-10-11 ENCOUNTER — APPOINTMENT (OUTPATIENT)
Dept: MRI IMAGING | Facility: CLINIC | Age: 23
End: 2023-10-11
Payer: MEDICAID

## 2023-10-11 PROCEDURE — 73221 MRI JOINT UPR EXTREM W/O DYE: CPT | Mod: RT

## 2023-10-30 ENCOUNTER — APPOINTMENT (OUTPATIENT)
Dept: ORTHOPEDIC SURGERY | Facility: CLINIC | Age: 23
End: 2023-10-30
Payer: MEDICAID

## 2023-10-30 PROCEDURE — 20606 DRAIN/INJ JOINT/BURSA W/US: CPT | Mod: RT

## 2023-10-30 PROCEDURE — 99214 OFFICE O/P EST MOD 30 MIN: CPT | Mod: 25

## 2023-10-30 RX ORDER — BETAMETHA AC,SOD PHOS/WATER/PF 6 MG/ML
6 (3-3) VIAL (ML) INJECTION
Qty: 1 | Refills: 0 | Status: COMPLETED | OUTPATIENT
Start: 2023-10-30

## 2023-10-30 RX ORDER — LIDOCAINE HYDROCHLORIDE 10 MG/ML
1 INJECTION, SOLUTION INFILTRATION; PERINEURAL
Refills: 0 | Status: COMPLETED | OUTPATIENT
Start: 2023-10-30

## 2023-10-30 RX ADMIN — Medication MG/ML: at 00:00

## 2023-10-30 RX ADMIN — Medication %: at 00:00

## 2023-11-10 ENCOUNTER — NON-APPOINTMENT (OUTPATIENT)
Age: 23
End: 2023-11-10

## 2023-11-20 ENCOUNTER — APPOINTMENT (OUTPATIENT)
Dept: ORTHOPEDIC SURGERY | Facility: CLINIC | Age: 23
End: 2023-11-20
Payer: MEDICAID

## 2023-11-20 PROCEDURE — 99213 OFFICE O/P EST LOW 20 MIN: CPT

## 2023-11-30 ENCOUNTER — APPOINTMENT (OUTPATIENT)
Dept: OBGYN | Facility: CLINIC | Age: 23
End: 2023-11-30

## 2023-12-03 ENCOUNTER — RX RENEWAL (OUTPATIENT)
Age: 23
End: 2023-12-03

## 2023-12-06 ENCOUNTER — APPOINTMENT (OUTPATIENT)
Dept: OBGYN | Facility: CLINIC | Age: 23
End: 2023-12-06
Payer: MEDICAID

## 2023-12-06 VITALS
DIASTOLIC BLOOD PRESSURE: 70 MMHG | WEIGHT: 160 LBS | HEIGHT: 67 IN | BODY MASS INDEX: 25.11 KG/M2 | SYSTOLIC BLOOD PRESSURE: 110 MMHG

## 2023-12-06 DIAGNOSIS — Z30.09 ENCOUNTER FOR OTHER GENERAL COUNSELING AND ADVICE ON CONTRACEPTION: ICD-10-CM

## 2023-12-06 DIAGNOSIS — N92.1 EXCESSIVE AND FREQUENT MENSTRUATION WITH IRREGULAR CYCLE: ICD-10-CM

## 2023-12-06 LAB — HCG UR QL: NEGATIVE

## 2023-12-06 PROCEDURE — 99213 OFFICE O/P EST LOW 20 MIN: CPT

## 2023-12-06 PROCEDURE — 81025 URINE PREGNANCY TEST: CPT

## 2023-12-06 RX ORDER — DOXYCLYCLINE HYCLATE 150 MG/1
TABLET, COATED ORAL
Refills: 0 | Status: DISCONTINUED | COMMUNITY
End: 2023-12-06

## 2023-12-06 RX ORDER — METRONIDAZOLE 7.5 MG/G
0.75 GEL VAGINAL
Qty: 1 | Refills: 0 | Status: DISCONTINUED | COMMUNITY
Start: 2023-09-15 | End: 2023-12-06

## 2023-12-08 ENCOUNTER — TRANSCRIPTION ENCOUNTER (OUTPATIENT)
Age: 23
End: 2023-12-08

## 2023-12-08 LAB
CANDIDA VAG CYTO: NOT DETECTED
G VAGINALIS+PREV SP MTYP VAG QL MICRO: NOT DETECTED
T VAGINALIS VAG QL WET PREP: NOT DETECTED

## 2024-01-03 ENCOUNTER — RX RENEWAL (OUTPATIENT)
Age: 24
End: 2024-01-03

## 2024-01-03 RX ORDER — NORETHINDRONE 0.35 MG/1
0.35 TABLET ORAL DAILY
Qty: 3 | Refills: 1 | Status: ACTIVE | COMMUNITY
Start: 2023-06-21 | End: 1900-01-01

## 2024-01-08 ENCOUNTER — APPOINTMENT (OUTPATIENT)
Dept: OBGYN | Facility: CLINIC | Age: 24
End: 2024-01-08
Payer: MEDICAID

## 2024-01-08 VITALS
DIASTOLIC BLOOD PRESSURE: 70 MMHG | BODY MASS INDEX: 24.8 KG/M2 | HEIGHT: 67 IN | WEIGHT: 158 LBS | SYSTOLIC BLOOD PRESSURE: 110 MMHG

## 2024-01-08 DIAGNOSIS — Z11.3 ENCOUNTER FOR SCREENING FOR INFECTIONS WITH A PREDOMINANTLY SEXUAL MODE OF TRANSMISSION: ICD-10-CM

## 2024-01-08 LAB
BILIRUB UR QL STRIP: NORMAL
CLARITY UR: CLEAR
COLLECTION METHOD: NORMAL
GLUCOSE UR-MCNC: NORMAL
HCG UR QL: 0.2 EU/DL
HGB UR QL STRIP.AUTO: NORMAL
KETONES UR-MCNC: NORMAL
LEUKOCYTE ESTERASE UR QL STRIP: NORMAL
NITRITE UR QL STRIP: NORMAL
PH UR STRIP: 5.5
PROT UR STRIP-MCNC: NORMAL
SP GR UR STRIP: 1.03

## 2024-01-08 PROCEDURE — 81003 URINALYSIS AUTO W/O SCOPE: CPT | Mod: NC,QW

## 2024-01-08 PROCEDURE — 99214 OFFICE O/P EST MOD 30 MIN: CPT

## 2024-01-22 ENCOUNTER — APPOINTMENT (OUTPATIENT)
Dept: ORTHOPEDIC SURGERY | Facility: CLINIC | Age: 24
End: 2024-01-22
Payer: MEDICAID

## 2024-01-22 PROCEDURE — 20606 DRAIN/INJ JOINT/BURSA W/US: CPT | Mod: RT

## 2024-01-22 PROCEDURE — 99214 OFFICE O/P EST MOD 30 MIN: CPT | Mod: 25

## 2024-01-22 PROCEDURE — 99215 OFFICE O/P EST HI 40 MIN: CPT | Mod: 25

## 2024-01-22 RX ORDER — LIDOCAINE HYDROCHLORIDE 10 MG/ML
1 INJECTION, SOLUTION INFILTRATION; PERINEURAL
Refills: 0 | Status: COMPLETED | OUTPATIENT
Start: 2024-01-22

## 2024-01-22 RX ORDER — METHYLPRED ACET/NACL,ISO-OS/PF 40 MG/ML
40 VIAL (ML) INJECTION
Qty: 1 | Refills: 0 | Status: COMPLETED | OUTPATIENT
Start: 2024-01-22

## 2024-01-22 RX ADMIN — METHYLPREDNISOLONE ACETATE 1 MG/ML: 40 INJECTION, SUSPENSION INTRA-ARTICULAR; INTRALESIONAL; INTRAMUSCULAR; SOFT TISSUE at 00:00

## 2024-01-22 RX ADMIN — LIDOCAINE HYDROCHLORIDE 0.5 %: 10 INJECTION, SOLUTION EPIDURAL; INFILTRATION; INTRACAUDAL; PERINEURAL at 00:00

## 2024-01-22 NOTE — HISTORY OF PRESENT ILLNESS
[FreeTextEntry1] : Follow-up regarding right wrist injury on 4/20/2023.   See prior notes.  She was given a cortisone injection in the region of the TFCC ligament less than 3 months ago.  She returns today for another TFC injection. The last injection gave her relief but her symptoms returned. She reports difficulty performing basic tasks such as lifting groceries due to pain.  Of note, she works for a .

## 2024-01-22 NOTE — DISCUSSION/SUMMARY
[FreeTextEntry1] : I had a discussion regarding today's visit, the diagnosis and treatment recommendations and options.  We also discussed changes since the last visit.  At this time, we discussed all her options, including surgical intervention versus another TFC injection. She opted to proceed with an injection at this time in order to avoid a conflict between surgical recovery and the demands of her job.  She does understand that this most likely will not provide her with long-term relief, and if it does not, then I would not recommend repeat injections in the future.  We briefly discussed arthroscopic TFCC debridement and expected recovery from her work.  She works as a .  The patient has agreed to the above plan of management and has expressed full understanding.  All questions were fully answered to the patient's satisfaction.  My cumulative time spent on today's visit was greater than 30 minutes and included: Preparation for the visit, review of the medical records, review of pertinent diagnostic studies, examination and counseling of the patient on the above diagnosis, treatment plan and prognosis, orders of diagnostic tests, medications and/or appropriate procedures and documentation in the medical records of today's visit.

## 2024-01-22 NOTE — ADDENDUM
[FreeTextEntry1] : I, Jozef Ascencio, acted solely as a scribe for Dr. Yin on this date on 01/22/2024.

## 2024-01-22 NOTE — END OF VISIT
[FreeTextEntry3] : This note was written by Jozef Ascencio on 01/22/2024 acting solely as a scribe for Dr. Alberto Yin.   All medical record entries made by the Scribe were at my, Dr. Alberto Yin, direction and personally dictated by me on 01/22/2024. I have personally reviewed the chart and agree that the record accurately reflects my personal performance of the history, physical exam, assessment and plan.

## 2024-01-22 NOTE — PROCEDURE
[FreeTextEntry1] :  -  After a discussion of risks and benefits, the patient agreed to proceed with a cortisone injection.   -  Side: Right ulno-carpal joint. -  Medications injected: 1 cc of 1% Lidocaine and 1 cc of Depo-Medrol, using sterile technique. -  Patient tolerated the procedure well, without complications.  -  Instructions: Patient was instructed on the use of a wrist splint, ice, anti-inflammatory agents or Tylenol, and activity modification was discussed.  -  Follow-up: According to her symptoms.

## 2024-01-22 NOTE — PHYSICAL EXAM
[de-identified] : - Constitutional: This is a female in no obvious distress.  - Psych: Patient is alert and oriented to person, place and time.  Patient has a normal mood and affect. - Cardiovascular: Normal pulses throughout the upper extremities.  No significant varicosities are noted in the upper extremities. - Neuro: Strength and sensation are intact throughout the upper extremities.  Patient has normal coordination. - Respiratory:  Patient exhibits no evidence of shortness of breath or difficulty breathing. - Skin: No rashes, lesions, or other abnormalities are noted in the upper extremities. ---  Examination of her right wrist and hand demonstrates no obvious swelling.  She has tenderness along the TFCC ligament.  There is no tenderness along the ECU tendon.  There is no instability of the ECU tendon with pronation and or supination.  She no longer has pain with ulnar deviation.  There is no tenderness along the dorsal wrist capsule or carpal bones.  She is neurovascularly intact distally. [de-identified] : An MRI of her right wrist dated 10/11/2023 demonstrated: 1.  Ulnar negative variance is present with degeneration and possibly early intrasubstance ganglion formation of the ulnar attachment fibers of the triangular fibrocartilage complex without discrete tearing. 2.  There is mild ulnar subluxation of the extensor carpi ulnaris with mild tendinopathy. 3.  Mild intrasubstance degeneration of the membranous component of the scapholunate ligament.   PA, lateral,  and oblique radiographs of the right wrist dated 10/4/2023 demonstrated no fractures or dislocations, negative ulnar variance.

## 2024-02-05 ENCOUNTER — APPOINTMENT (OUTPATIENT)
Dept: OBGYN | Facility: CLINIC | Age: 24
End: 2024-02-05
Payer: MEDICAID

## 2024-02-05 VITALS
DIASTOLIC BLOOD PRESSURE: 60 MMHG | BODY MASS INDEX: 25.58 KG/M2 | SYSTOLIC BLOOD PRESSURE: 100 MMHG | HEIGHT: 67 IN | WEIGHT: 163 LBS

## 2024-02-05 DIAGNOSIS — B96.89 ACUTE VAGINITIS: ICD-10-CM

## 2024-02-05 DIAGNOSIS — N76.0 ACUTE VAGINITIS: ICD-10-CM

## 2024-02-05 LAB
BILIRUB UR QL STRIP: NORMAL
CLARITY UR: CLEAR
COLLECTION METHOD: NORMAL
GLUCOSE UR-MCNC: NORMAL
HCG UR QL: 0.2 EU/DL
HGB UR QL STRIP.AUTO: NORMAL
KETONES UR-MCNC: NORMAL
LEUKOCYTE ESTERASE UR QL STRIP: NORMAL
NITRITE UR QL STRIP: NORMAL
PH UR STRIP: 7
PROT UR STRIP-MCNC: NORMAL
SP GR UR STRIP: 1.02

## 2024-02-05 PROCEDURE — 99214 OFFICE O/P EST MOD 30 MIN: CPT

## 2024-02-05 PROCEDURE — 81003 URINALYSIS AUTO W/O SCOPE: CPT | Mod: QW

## 2024-02-05 NOTE — PHYSICAL EXAM
[Labia Majora] : labia major [Labia Minora] : labia minora [Normal] : clitoris [FreeTextEntry4] : scant thin, white vaginal discharge consistent with BV

## 2024-02-06 LAB
HAV IGM SER QL: NONREACTIVE
HBV CORE IGM SER QL: NONREACTIVE
HBV SURFACE AG SER QL: NONREACTIVE
HCV AB SER QL: NONREACTIVE
HCV S/CO RATIO: 0.19 S/CO
HIV1+2 AB SPEC QL IA.RAPID: NONREACTIVE
T PALLIDUM AB SER QL IA: NEGATIVE

## 2024-02-07 ENCOUNTER — TRANSCRIPTION ENCOUNTER (OUTPATIENT)
Age: 24
End: 2024-02-07

## 2024-02-07 LAB
CANDIDA VAG CYTO: NOT DETECTED
G VAGINALIS+PREV SP MTYP VAG QL MICRO: DETECTED
HSV 1 IGG TYPE-SPECIFIC AB: 55.6 INDEX
HSV 2 IGG TYPE-SPECIFIC AB: 0.95 INDEX
T VAGINALIS VAG QL WET PREP: NOT DETECTED

## 2024-02-14 ENCOUNTER — TRANSCRIPTION ENCOUNTER (OUTPATIENT)
Age: 24
End: 2024-02-14

## 2024-02-15 ENCOUNTER — NON-APPOINTMENT (OUTPATIENT)
Age: 24
End: 2024-02-15

## 2024-02-26 ENCOUNTER — APPOINTMENT (OUTPATIENT)
Dept: ORTHOPEDIC SURGERY | Facility: CLINIC | Age: 24
End: 2024-02-26
Payer: MEDICAID

## 2024-02-26 VITALS — BODY MASS INDEX: 25.58 KG/M2 | WEIGHT: 163 LBS | HEIGHT: 67 IN

## 2024-02-26 PROCEDURE — 99214 OFFICE O/P EST MOD 30 MIN: CPT

## 2024-02-26 RX ORDER — MELOXICAM 15 MG/1
15 TABLET ORAL
Qty: 30 | Refills: 0 | Status: DISCONTINUED | COMMUNITY
Start: 2023-07-26 | End: 2024-02-26

## 2024-02-26 RX ORDER — FLUCONAZOLE 150 MG/1
150 TABLET ORAL
Qty: 2 | Refills: 3 | Status: DISCONTINUED | COMMUNITY
Start: 2024-02-14 | End: 2024-02-26

## 2024-02-26 RX ORDER — METRONIDAZOLE 7.5 MG/G
0.75 GEL VAGINAL
Qty: 1 | Refills: 0 | Status: DISCONTINUED | COMMUNITY
Start: 2024-02-05 | End: 2024-02-26

## 2024-02-26 RX ORDER — PREDNISOLONE 5 MG/1
TABLET ORAL
Refills: 0 | Status: DISCONTINUED | COMMUNITY
End: 2024-02-26

## 2024-02-26 RX ORDER — OMEPRAZOLE 20 MG/1
TABLET, DELAYED RELEASE ORAL
Refills: 0 | Status: ACTIVE | COMMUNITY

## 2024-02-26 RX ORDER — METRONIDAZOLE 7.5 MG/G
0.75 GEL VAGINAL
Qty: 1 | Refills: 0 | Status: DISCONTINUED | COMMUNITY
Start: 2024-01-08 | End: 2024-02-26

## 2024-02-26 RX ORDER — VALACYCLOVIR 500 MG/1
500 TABLET, FILM COATED ORAL TWICE DAILY
Qty: 60 | Refills: 1 | Status: DISCONTINUED | COMMUNITY
Start: 2023-09-13 | End: 2024-02-26

## 2024-02-26 NOTE — DISCUSSION/SUMMARY
[FreeTextEntry1] : I had a discussion regarding today's visit, the diagnosis and treatment recommendations and options.  We also discussed changes since the last visit.  At this time, as she is having persistent symptoms, status post 2 prior cortisone injections, I would not recommend a third cortisone injection.  I recommended surgical intervention. She opted to proceed. She will contact our surgical scheduler to schedule her procedure for May. I provided her a work note today documenting that she is scheduled for surgery in May and that she will likely be out of work for one month. She understands that the exact details of her recovery and condition will likely change based on my evaluation of her condition during and in the days following her surgery.  -  The nature and purposes of the operation/procedure was discussed in detail.  I discussed the surgical procedure in detail, as well as expected postoperative recovery and outcome. -  Possible risks, benefits, and complications (from known and unknown causes) of the procedure were discussed in detail.  -  Possible non-operative alternatives to the proposed treatment were discussed in detail.  -  She was told that possible risks/complications include, but are not limited to:  Infection, nerve or vessel injury, stiffness, painful scar, poor outcome, need for additional surgical procedures, and other unforeseen complications.  -  In addition, the possibility of an "unsuccessful outcome," despite "successful surgery," was discussed with the patient.  I did tell her that there is no absolute guarantees that her symptoms will resolve. -  The patient fully understands these risks and wishes to proceed.  -  I had a lengthy discussion with the patient regarding today's visit, the diagnosis, and my surgical treatment recommendations.  The patient has agreed to the above plan of management and has expressed full understanding.  All questions were fully answered to the patient's satisfaction.  My cumulative time spent on today's visit was greater than 30 minutes and included: Preparation for the visit, review of the medical records, review of pertinent diagnostic studies, examination and counseling of the patient on the above diagnosis, treatment plan and prognosis, orders of diagnostic tests, medications and/or appropriate procedures and documentation in the medical records of today's visit.

## 2024-02-26 NOTE — HISTORY OF PRESENT ILLNESS
[FreeTextEntry1] : Follow-up regarding right wrist injury on 4/20/2023.   See prior notes.  She was given a second cortisone injection in the region of the TFCC ligament 5 weeks ago.  She returns today complaining of returned symptoms at her right wrist. She is having persistent pain which is worse at night and worse with lifting. She is here to discuss surgery.  Of note, she works for a .

## 2024-02-26 NOTE — END OF VISIT
[FreeTextEntry3] : This note was written by Jozef Ascencio on 2/26/24 acting solely as a scribe for Dr. Alberto Yin.   All medical record entries made by the Scribe were at my, Dr. Alberto Yin, direction and personally dictated by me on 2/26/24. I have personally reviewed the chart and agree that the record accurately reflects my personal performance of the history, physical exam, assessment and plan.

## 2024-02-26 NOTE — PHYSICAL EXAM
[de-identified] : An MRI of her right wrist dated 10/11/2023 demonstrated: 1.  Ulnar negative variance is present with degeneration and possibly early intrasubstance ganglion formation of the ulnar attachment fibers of the triangular fibrocartilage complex without discrete tearing. 2.  There is mild ulnar subluxation of the extensor carpi ulnaris with mild tendinopathy. 3.  Mild intrasubstance degeneration of the membranous component of the scapholunate ligament.   PA, lateral,  and oblique radiographs of the right wrist dated 10/4/2023 demonstrated no fractures or dislocations, negative ulnar variance. [de-identified] : - Constitutional: This is a female in no obvious distress.  - Psych: Patient is alert and oriented to person, place and time.  Patient has a normal mood and affect. - Cardiovascular: Normal pulses throughout the upper extremities.  No significant varicosities are noted in the upper extremities. - Neuro: Strength and sensation are intact throughout the upper extremities.  Patient has normal coordination. - Respiratory:  Patient exhibits no evidence of shortness of breath or difficulty breathing. - Skin: No rashes, lesions, or other abnormalities are noted in the upper extremities. ---  Examination of her right wrist and hand demonstrates no obvious swelling.  She has persistent tenderness along the TFCC ligament.  There is no tenderness along the ECU tendon.  There is no instability of the ECU tendon with pronation and or supination.  She no longer has pain with ulnar deviation.  There is no tenderness along the dorsal wrist capsule or carpal bones.  She is neurovascularly intact distally.

## 2024-03-29 DIAGNOSIS — R76.8 OTHER SPECIFIED ABNORMAL IMMUNOLOGICAL FINDINGS IN SERUM: ICD-10-CM

## 2024-04-08 RX ORDER — METRONIDAZOLE 7.5 MG/G
0.75 GEL VAGINAL
Qty: 1 | Refills: 0 | Status: ACTIVE | COMMUNITY
Start: 2024-02-14 | End: 1900-01-01

## 2024-04-09 ENCOUNTER — TRANSCRIPTION ENCOUNTER (OUTPATIENT)
Age: 24
End: 2024-04-09

## 2024-04-09 PROBLEM — R76.8 HSV-2 SEROPOSITIVE: Status: ACTIVE | Noted: 2024-04-09

## 2024-05-15 ENCOUNTER — OUTPATIENT (OUTPATIENT)
Dept: OUTPATIENT SERVICES | Facility: HOSPITAL | Age: 24
LOS: 1 days | End: 2024-05-15
Payer: COMMERCIAL

## 2024-05-15 VITALS
WEIGHT: 166.01 LBS | RESPIRATION RATE: 15 BRPM | DIASTOLIC BLOOD PRESSURE: 76 MMHG | HEART RATE: 75 BPM | HEIGHT: 66 IN | OXYGEN SATURATION: 99 % | SYSTOLIC BLOOD PRESSURE: 136 MMHG | TEMPERATURE: 98 F

## 2024-05-15 DIAGNOSIS — Z01.818 ENCOUNTER FOR OTHER PREPROCEDURAL EXAMINATION: ICD-10-CM

## 2024-05-15 DIAGNOSIS — Z90.3 ACQUIRED ABSENCE OF STOMACH [PART OF]: Chronic | ICD-10-CM

## 2024-05-15 DIAGNOSIS — M25.531 PAIN IN RIGHT WRIST: ICD-10-CM

## 2024-05-15 DIAGNOSIS — Z90.89 ACQUIRED ABSENCE OF OTHER ORGANS: Chronic | ICD-10-CM

## 2024-05-15 DIAGNOSIS — S69.81XA OTHER SPECIFIED INJURIES OF RIGHT WRIST, HAND AND FINGER(S), INITIAL ENCOUNTER: ICD-10-CM

## 2024-05-15 LAB
HCT VFR BLD CALC: 39.6 % — SIGNIFICANT CHANGE UP (ref 34.5–45)
HGB BLD-MCNC: 13.3 G/DL — SIGNIFICANT CHANGE UP (ref 11.5–15.5)
MCHC RBC-ENTMCNC: 30 PG — SIGNIFICANT CHANGE UP (ref 27–34)
MCHC RBC-ENTMCNC: 33.6 GM/DL — SIGNIFICANT CHANGE UP (ref 32–36)
MCV RBC AUTO: 89.2 FL — SIGNIFICANT CHANGE UP (ref 80–100)
NRBC # BLD: 0 /100 WBCS — SIGNIFICANT CHANGE UP (ref 0–0)
PLATELET # BLD AUTO: 180 K/UL — SIGNIFICANT CHANGE UP (ref 150–400)
RBC # BLD: 4.44 M/UL — SIGNIFICANT CHANGE UP (ref 3.8–5.2)
RBC # FLD: 12.6 % — SIGNIFICANT CHANGE UP (ref 10.3–14.5)
WBC # BLD: 6.43 K/UL — SIGNIFICANT CHANGE UP (ref 3.8–10.5)
WBC # FLD AUTO: 6.43 K/UL — SIGNIFICANT CHANGE UP (ref 3.8–10.5)

## 2024-05-15 PROCEDURE — 85027 COMPLETE CBC AUTOMATED: CPT

## 2024-05-15 PROCEDURE — 36415 COLL VENOUS BLD VENIPUNCTURE: CPT

## 2024-05-15 PROCEDURE — G0463: CPT

## 2024-05-15 NOTE — H&P PST ADULT - HISTORY OF PRESENT ILLNESS
25 yo female reports pain in right wrist radiation to ight elbow and decreased ROM.  She is scheduled for right wrist arthroscopy and TFCC debridement on 5/23/2024 @ [facility].  25 yo female reports pain in right wrist radiation to right elbow and decreased ROM.  She is scheduled for right wrist arthroscopy and TFCC debridement on 5/23/2024 @ [facility].

## 2024-05-15 NOTE — H&P PST ADULT - PROBLEM SELECTOR PLAN 1
Right wrist arthroscopy and TFCC debridement is planned for 5/23/2024  Diagnostic testing performed  Pre op instructions were reviewed  Best wishes offered

## 2024-05-15 NOTE — H&P PST ADULT - NSICDXPASTMEDICALHX_GEN_ALL_CORE_FT
PAST MEDICAL HISTORY:  GERD (gastroesophageal reflux disease)     Marijuana user      PAST MEDICAL HISTORY:  GERD (gastroesophageal reflux disease)     Marijuana user     Right wrist pain     Uses oral contraceptives as primary birth control method

## 2024-05-17 PROBLEM — Z78.9 OTHER SPECIFIED HEALTH STATUS: Chronic | Status: ACTIVE | Noted: 2024-05-15

## 2024-05-17 PROBLEM — K21.9 GASTRO-ESOPHAGEAL REFLUX DISEASE WITHOUT ESOPHAGITIS: Chronic | Status: ACTIVE | Noted: 2024-05-15

## 2024-05-17 PROBLEM — M25.531 PAIN IN RIGHT WRIST: Chronic | Status: ACTIVE | Noted: 2024-05-15

## 2024-05-17 PROBLEM — F12.90 CANNABIS USE, UNSPECIFIED, UNCOMPLICATED: Chronic | Status: ACTIVE | Noted: 2024-05-15

## 2024-05-20 ENCOUNTER — APPOINTMENT (OUTPATIENT)
Dept: OBGYN | Facility: CLINIC | Age: 24
End: 2024-05-20
Payer: MEDICAID

## 2024-05-20 VITALS
SYSTOLIC BLOOD PRESSURE: 106 MMHG | WEIGHT: 165 LBS | DIASTOLIC BLOOD PRESSURE: 66 MMHG | HEIGHT: 66 IN | BODY MASS INDEX: 26.52 KG/M2

## 2024-05-20 DIAGNOSIS — R10.2 PELVIC AND PERINEAL PAIN: ICD-10-CM

## 2024-05-20 DIAGNOSIS — N89.8 OTHER SPECIFIED NONINFLAMMATORY DISORDERS OF VAGINA: ICD-10-CM

## 2024-05-20 DIAGNOSIS — R35.0 FREQUENCY OF MICTURITION: ICD-10-CM

## 2024-05-20 LAB
BILIRUB UR QL STRIP: NEGATIVE
CLARITY UR: NORMAL
COLLECTION METHOD: NORMAL
GLUCOSE UR-MCNC: NEGATIVE
HCG UR QL: 2 EU/DL
HGB UR QL STRIP.AUTO: NEGATIVE
KETONES UR-MCNC: NEGATIVE
LEUKOCYTE ESTERASE UR QL STRIP: NEGATIVE
NITRITE UR QL STRIP: NEGATIVE
PH UR STRIP: 7
PROT UR STRIP-MCNC: NEGATIVE
SP GR UR STRIP: 1.02

## 2024-05-20 PROCEDURE — 99214 OFFICE O/P EST MOD 30 MIN: CPT

## 2024-05-20 PROCEDURE — 81003 URINALYSIS AUTO W/O SCOPE: CPT | Mod: QW

## 2024-05-20 RX ORDER — METRONIDAZOLE 7.5 MG/G
0.75 GEL VAGINAL
Qty: 1 | Refills: 2 | Status: ACTIVE | COMMUNITY
Start: 2024-05-20 | End: 1900-01-01

## 2024-05-20 RX ORDER — CLINDAMYCIN PHOSPHATE 20 MG/G
2 CREAM VAGINAL
Qty: 1 | Refills: 0 | Status: ACTIVE | COMMUNITY
Start: 2024-05-20 | End: 1900-01-01

## 2024-05-20 NOTE — PHYSICAL EXAM
[Labia Majora] : labia major [Labia Minora] : labia minora [Normal] : clitoris [Scant] : there was scant vaginal bleeding

## 2024-05-21 ENCOUNTER — NON-APPOINTMENT (OUTPATIENT)
Age: 24
End: 2024-05-21

## 2024-05-21 LAB
C TRACH RRNA SPEC QL NAA+PROBE: NOT DETECTED
CANDIDA VAG CYTO: NOT DETECTED
G VAGINALIS+PREV SP MTYP VAG QL MICRO: NOT DETECTED
N GONORRHOEA RRNA SPEC QL NAA+PROBE: NOT DETECTED
SOURCE AMPLIFICATION: NORMAL
T VAGINALIS VAG QL WET PREP: NOT DETECTED

## 2024-05-22 ENCOUNTER — TRANSCRIPTION ENCOUNTER (OUTPATIENT)
Age: 24
End: 2024-05-22

## 2024-05-22 ENCOUNTER — APPOINTMENT (OUTPATIENT)
Dept: ORTHOPEDIC SURGERY | Facility: CLINIC | Age: 24
End: 2024-05-22
Payer: MEDICAID

## 2024-05-22 PROCEDURE — 99214 OFFICE O/P EST MOD 30 MIN: CPT

## 2024-05-22 NOTE — PHYSICAL EXAM
[de-identified] : - Constitutional: This is a female in no obvious distress.  - Psych: Patient is alert and oriented to person, place and time.  Patient has a normal mood and affect. - Cardiovascular: Normal pulses throughout the upper extremities.  No significant varicosities are noted in the upper extremities. - Neuro: Strength and sensation are intact throughout the upper extremities.  Patient has normal coordination. - Respiratory:  Patient exhibits no evidence of shortness of breath or difficulty breathing. - Skin: No rashes, lesions, or other abnormalities are noted in the upper extremities. ---  Examination of her right wrist and hand demonstrates no obvious swelling.  She has persistent tenderness along the TFCC ligament.  There is no tenderness along the ECU tendon.  There is no instability of the ECU tendon with pronation and or supination.  She no longer has pain with ulnar deviation.  There is no tenderness along the dorsal wrist capsule or carpal bones.  She is neurovascularly intact distally.  Examination of her left wrist and hand demonstrates no obvious swelling.  She has complaints of pain radiating down the left side of her neck which is recreated with extension and lateral bending.  She denies numbness or tingling.  She has intact strength and sensation distally along the C5-T1 distributions. [de-identified] : An MRI of her right wrist dated 10/11/2023 demonstrated: 1.  Ulnar negative variance is present with degeneration and possibly early intrasubstance ganglion formation of the ulnar attachment fibers of the triangular fibrocartilage complex without discrete tearing. 2.  There is mild ulnar subluxation of the extensor carpi ulnaris with mild tendinopathy. 3.  Mild intrasubstance degeneration of the membranous component of the scapholunate ligament.   PA, lateral,  and oblique radiographs of the right wrist dated 10/4/2023 demonstrated no fractures or dislocations, negative ulnar variance.

## 2024-05-22 NOTE — HISTORY OF PRESENT ILLNESS
[FreeTextEntry1] : She returns today for left wrist pain for the past 2 weeks. She rates her pain as a 7/10 and has radiating pain, along with weakness. She has difficulty making a fist. She denies any numbness and tingling. She states that the pain starts from her neck.   She is scheduled for left wrist arthroscopy and TFCC debridement tomorrow.  Of note, she works for a .

## 2024-05-22 NOTE — DISCUSSION/SUMMARY
[FreeTextEntry1] : I had a discussion regarding today's visit, the diagnosis and treatment recommendations and options.  We also discussed changes since the last visit.    She has findings consistent with left-sided cervicalgia.  At this time, with regard to her neck, she was instructed on warm compresses and meloxicam as needed. As she is scheduled for surgery tomorrow, I told her that I will prescribe an anti-inflammatory for her to take post-operatively.  I did tell her that if she is having significant pain, she can delay the surgery which she deferred.  If her symptoms worsen after the surgery, then I will have her see my partner who is a spine specialist, or she can return to the physician who she previously saw.  The patient has agreed to the above plan of management and has expressed full understanding.  All questions were fully answered to the patient's satisfaction.  My cumulative time spent on today's visit was greater than 30 minutes and included: Preparation for the visit, review of the medical records, review of pertinent diagnostic studies, examination and counseling of the patient on the above diagnosis, treatment plan and prognosis, orders of diagnostic tests, medications and/or appropriate procedures and documentation in the medical records of today's visit.

## 2024-05-23 ENCOUNTER — OUTPATIENT (OUTPATIENT)
Dept: OUTPATIENT SERVICES | Facility: HOSPITAL | Age: 24
LOS: 1 days | End: 2024-05-23
Payer: COMMERCIAL

## 2024-05-23 ENCOUNTER — TRANSCRIPTION ENCOUNTER (OUTPATIENT)
Age: 24
End: 2024-05-23

## 2024-05-23 ENCOUNTER — APPOINTMENT (OUTPATIENT)
Dept: ORTHOPEDIC SURGERY | Facility: HOSPITAL | Age: 24
End: 2024-05-23

## 2024-05-23 VITALS
RESPIRATION RATE: 15 BRPM | OXYGEN SATURATION: 100 % | SYSTOLIC BLOOD PRESSURE: 112 MMHG | HEART RATE: 62 BPM | DIASTOLIC BLOOD PRESSURE: 68 MMHG

## 2024-05-23 VITALS
OXYGEN SATURATION: 100 % | SYSTOLIC BLOOD PRESSURE: 117 MMHG | TEMPERATURE: 97 F | HEIGHT: 55 IN | HEART RATE: 69 BPM | WEIGHT: 166.67 LBS | RESPIRATION RATE: 10 BRPM | DIASTOLIC BLOOD PRESSURE: 84 MMHG

## 2024-05-23 DIAGNOSIS — S69.81XA OTHER SPECIFIED INJURIES OF RIGHT WRIST, HAND AND FINGER(S), INITIAL ENCOUNTER: ICD-10-CM

## 2024-05-23 DIAGNOSIS — Z90.89 ACQUIRED ABSENCE OF OTHER ORGANS: Chronic | ICD-10-CM

## 2024-05-23 DIAGNOSIS — Z90.3 ACQUIRED ABSENCE OF STOMACH [PART OF]: Chronic | ICD-10-CM

## 2024-05-23 DIAGNOSIS — Z01.818 ENCOUNTER FOR OTHER PREPROCEDURAL EXAMINATION: ICD-10-CM

## 2024-05-23 LAB — HCG UR QL: NEGATIVE — SIGNIFICANT CHANGE UP

## 2024-05-23 PROCEDURE — 81025 URINE PREGNANCY TEST: CPT

## 2024-05-23 PROCEDURE — 29846 WRIST ARTHROSCOPY/SURGERY: CPT | Mod: RT

## 2024-05-23 PROCEDURE — 29846 WRIST ARTHROSCOPY/SURGERY: CPT | Mod: AS,RT

## 2024-05-23 RX ORDER — OXYCODONE HYDROCHLORIDE 5 MG/1
5 TABLET ORAL ONCE
Refills: 0 | Status: DISCONTINUED | OUTPATIENT
Start: 2024-05-23 | End: 2024-05-23

## 2024-05-23 RX ORDER — OMEPRAZOLE 10 MG/1
1 CAPSULE, DELAYED RELEASE ORAL
Refills: 0 | DISCHARGE

## 2024-05-23 RX ORDER — CEFAZOLIN SODIUM 1 G
2000 VIAL (EA) INJECTION ONCE
Refills: 0 | Status: COMPLETED | OUTPATIENT
Start: 2024-05-23 | End: 2024-05-23

## 2024-05-23 RX ORDER — IBUPROFEN 200 MG
1 TABLET ORAL
Qty: 15 | Refills: 0
Start: 2024-05-23

## 2024-05-23 RX ORDER — ONDANSETRON 8 MG/1
4 TABLET, FILM COATED ORAL ONCE
Refills: 0 | Status: DISCONTINUED | OUTPATIENT
Start: 2024-05-23 | End: 2024-05-23

## 2024-05-23 RX ORDER — APREPITANT 80 MG/1
40 CAPSULE ORAL ONCE
Refills: 0 | Status: COMPLETED | OUTPATIENT
Start: 2024-05-23 | End: 2024-05-23

## 2024-05-23 RX ORDER — ACETAMINOPHEN WITH CODEINE 300MG-30MG
1 TABLET ORAL
Qty: 10 | Refills: 0
Start: 2024-05-23

## 2024-05-23 RX ORDER — HYDROMORPHONE HYDROCHLORIDE 2 MG/ML
1 INJECTION INTRAMUSCULAR; INTRAVENOUS; SUBCUTANEOUS
Refills: 0 | Status: DISCONTINUED | OUTPATIENT
Start: 2024-05-23 | End: 2024-05-23

## 2024-05-23 RX ORDER — NORETHINDRONE 0.35 MG/1
1 TABLET ORAL
Refills: 0 | DISCHARGE

## 2024-05-23 RX ORDER — HYDROMORPHONE HYDROCHLORIDE 2 MG/ML
0.5 INJECTION INTRAMUSCULAR; INTRAVENOUS; SUBCUTANEOUS
Refills: 0 | Status: DISCONTINUED | OUTPATIENT
Start: 2024-05-23 | End: 2024-05-23

## 2024-05-23 RX ORDER — SODIUM CHLORIDE 9 MG/ML
1000 INJECTION, SOLUTION INTRAVENOUS
Refills: 0 | Status: DISCONTINUED | OUTPATIENT
Start: 2024-05-23 | End: 2024-05-23

## 2024-05-23 RX ORDER — CHLORHEXIDINE GLUCONATE 213 G/1000ML
1 SOLUTION TOPICAL ONCE
Refills: 0 | Status: COMPLETED | OUTPATIENT
Start: 2024-05-23 | End: 2024-05-23

## 2024-05-23 RX ADMIN — CHLORHEXIDINE GLUCONATE 1 APPLICATION(S): 213 SOLUTION TOPICAL at 11:59

## 2024-05-23 RX ADMIN — APREPITANT 40 MILLIGRAM(S): 80 CAPSULE ORAL at 11:59

## 2024-05-23 NOTE — ASU PATIENT PROFILE, ADULT - NSICDXPASTMEDICALHX_GEN_ALL_CORE_FT
PAST MEDICAL HISTORY:  GERD (gastroesophageal reflux disease)     Marijuana user     Right wrist pain     Uses oral contraceptives as primary birth control method

## 2024-05-23 NOTE — BRIEF OPERATIVE NOTE - NSICDXBRIEFPREOP_GEN_ALL_CORE_FT
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

## 2024-05-23 NOTE — BRIEF OPERATIVE NOTE - NSICDXBRIEFPOSTOP_GEN_ALL_CORE_FT
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

## 2024-05-23 NOTE — ASU DISCHARGE PLAN (ADULT/PEDIATRIC) - CARE PROVIDER_API CALL
Alberto Yin)  Surgery of the Hand  [address], [address]  [place] [address]  Phone: [phone]  Fax: [phone]  Scheduled Appointment: 06/03/2024

## 2024-05-23 NOTE — ASU DISCHARGE PLAN (ADULT/PEDIATRIC) - NS MD DC FALL RISK RISK
For information on Fall & Injury Prevention, visit: [email or web address] OR  [email or web address] OR  https://www.cdc.gov/steadi/patient.html

## 2024-05-23 NOTE — ASU DISCHARGE PLAN (ADULT/PEDIATRIC) - ASU DC SPECIAL INSTRUCTIONSFT
Elevate your  arm in sling daily when up & walking.  Elevate the  hand/arm above heart level on pillow/blankets when lying down.  Pad the neck strap with athletic sock/collared shirt.  Apply ice packs to the right wrist for 30 minutes every 3 hrs daily.  Keep bandage clean, dry , & intact.  You are advised to open & close and wiggle the fingers of the operated arm every hour for exercise.  Call the Dr.  for fever, severe pain, fall or hand injury.  Call for an appointment on June 3rd.

## 2024-05-23 NOTE — BRIEF OPERATIVE NOTE - SECOND ASSISTANT
Date of Service: 08/29/2018    SURGEON:  Justyn Centeno MD.    ASSISSTANT:  Ortiz Glasgow NP.    PREOPERATIVE DIAGNOSIS:  Right tennis elbow.    POSTOPERATIVE DIAGNOSIS:  Right tennis elbow.    PROCEDURE PERFORMED:  Right tennis elbow release.    COMPLICATIONS:  None.    CONDITION:  Stable.    DESCRIPTION OF PROCEDURE:  The patient taken to the operating room, prepped and draped in standard fashion, antibiotics given.  Time-out was done.  Exam under anesthesia revealed good range of motion, no instability.  Made standard incisions.  We identified the elbow, identified the area where we dissected down to the ECRB.  We identified the ECRB.  The ECRB was significantly degenerative and torn.  The lateral collateral ligament appeared to be healed proximally.    We went ahead and resected the degenerative portion of the tendon out.  We were able to clean off the area, preserve the ligaments.  We prepped the radial side of the humerus in standard fashion, put some drill holes in that side.  Got nice bed of bleeding bone.  We thoroughly irrigated, closed in layered fashion.  The patient was stable and discharged to recovery in stable condition.      Dictated By: Justyn Centeno MD  Signing Provider: Justyn Centeno MD    NK/ ([id number])  DD: 08/29/2018 07:10:07 TD: 08/29/2018 07:28:43    Copy Sent To:    No Assistant

## 2024-05-28 PROBLEM — M77.8 RIGHT WRIST TENDINITIS: Status: ACTIVE | Noted: 2023-10-24

## 2024-05-28 PROBLEM — S63.501A RIGHT WRIST SPRAIN: Status: ACTIVE | Noted: 2023-10-04

## 2024-05-28 PROBLEM — M54.2 CERVICALGIA: Status: ACTIVE | Noted: 2022-08-23

## 2024-05-29 ENCOUNTER — OFFICE (OUTPATIENT)
Dept: URBAN - METROPOLITAN AREA CLINIC 109 | Facility: CLINIC | Age: 24
Setting detail: OPHTHALMOLOGY
End: 2024-05-29
Payer: COMMERCIAL

## 2024-05-29 DIAGNOSIS — H10.212: ICD-10-CM

## 2024-05-29 PROBLEM — H16.222: Status: ACTIVE | Noted: 2024-05-29

## 2024-05-29 PROCEDURE — 99203 OFFICE O/P NEW LOW 30 MIN: CPT | Performed by: OPHTHALMOLOGY

## 2024-05-29 ASSESSMENT — LID EXAM ASSESSMENTS: OS_COMMENTS: NO FOREIGN BODY WITH LID EVERTED

## 2024-05-29 ASSESSMENT — CONFRONTATIONAL VISUAL FIELD TEST (CVF)
OD_FINDINGS: FULL
OS_FINDINGS: FULL

## 2024-05-30 ENCOUNTER — APPOINTMENT (OUTPATIENT)
Dept: ORTHOPEDIC SURGERY | Facility: CLINIC | Age: 24
End: 2024-05-30
Payer: MEDICAID

## 2024-05-30 PROCEDURE — 99024 POSTOP FOLLOW-UP VISIT: CPT

## 2024-05-30 NOTE — HISTORY OF PRESENT ILLNESS
[de-identified] : 1 week postoperative. [de-identified] : 1 week status post right wrist arthroscopy and TFCC debridement.  Date of surgery: 5/23/2024.  She is doing well. She is still having some throbbing pain near the incision. [de-identified] : Examination of the right wrist after the splint and dressing were removed demonstrates her portal incisions to be clean and dry.  There is some swelling and ecchymosis.  She has full flexion and extension of the digits.  She is neurovascularly intact distally. [de-identified] : Stable, 1 week postoperative. [de-identified] : The sutures were removed and Steri-Strips were applied.  She was fitted with a removable splint.  She was instructed on local wound care, protection and gentle range of motion exercises.  She will follow-up in 2-3 weeks before she is scheduled to return to work.

## 2024-05-30 NOTE — END OF VISIT
[FreeTextEntry3] : This note was written by Jozef Ascencio on 05/30/2024 acting solely as a scribe for Dr. Alberto Yin.   All medical record entries made by the Scribe were at my, Dr. Alberto Yin, direction and personally dictated by me on 05/30/2024. I have personally reviewed the chart and agree that the record accurately reflects my personal performance of the history, physical exam, assessment and plan.

## 2024-05-30 NOTE — ADDENDUM
[FreeTextEntry1] : I, Jozef Ascencio, acted solely as a scribe for Dr. Yin on this date on 05/30/2024.

## 2024-06-02 ENCOUNTER — NON-APPOINTMENT (OUTPATIENT)
Age: 24
End: 2024-06-02

## 2024-06-03 ENCOUNTER — APPOINTMENT (OUTPATIENT)
Dept: ORTHOPEDIC SURGERY | Facility: CLINIC | Age: 24
End: 2024-06-03
Payer: MEDICAID

## 2024-06-03 DIAGNOSIS — M77.8 OTHER ENTHESOPATHIES, NOT ELSEWHERE CLASSIFIED: ICD-10-CM

## 2024-06-03 DIAGNOSIS — S63.501A UNSPECIFIED SPRAIN OF RIGHT WRIST, INITIAL ENCOUNTER: ICD-10-CM

## 2024-06-03 DIAGNOSIS — M54.2 CERVICALGIA: ICD-10-CM

## 2024-06-05 ENCOUNTER — APPOINTMENT (OUTPATIENT)
Dept: OBGYN | Facility: CLINIC | Age: 24
End: 2024-06-05
Payer: MEDICAID

## 2024-06-05 VITALS
HEIGHT: 66 IN | BODY MASS INDEX: 26.84 KG/M2 | WEIGHT: 167 LBS | SYSTOLIC BLOOD PRESSURE: 114 MMHG | RESPIRATION RATE: 16 BRPM | HEART RATE: 68 BPM | DIASTOLIC BLOOD PRESSURE: 68 MMHG

## 2024-06-05 DIAGNOSIS — N89.8 OTHER SPECIFIED NONINFLAMMATORY DISORDERS OF VAGINA: ICD-10-CM

## 2024-06-05 PROCEDURE — 99214 OFFICE O/P EST MOD 30 MIN: CPT

## 2024-06-05 NOTE — PHYSICAL EXAM
[Labia Majora] : labia major [Labia Minora] : labia minora [Normal] : clitoris [FreeTextEntry4] : small area of superficial erosion likely due to razor blade; pt used metrogel last night

## 2024-06-05 NOTE — CHIEF COMPLAINT
[FreeTextEntry1] : Oksana is a 23 y/o G0 who presents today with c/o vulvar burning itching to the right inner aspect of the labia which occured after shaving. she denies any abnormal vaginal discharge or odor.

## 2024-06-07 ENCOUNTER — TRANSCRIPTION ENCOUNTER (OUTPATIENT)
Age: 24
End: 2024-06-07

## 2024-06-08 ENCOUNTER — NON-APPOINTMENT (OUTPATIENT)
Age: 24
End: 2024-06-08

## 2024-06-10 PROBLEM — S69.81XA INJURY OF TRIANGULAR FIBROCARTILAGE COMPLEX (TFCC) OF RIGHT WRIST: Status: ACTIVE | Noted: 2023-10-30

## 2024-06-17 ENCOUNTER — APPOINTMENT (OUTPATIENT)
Dept: ORTHOPEDIC SURGERY | Facility: CLINIC | Age: 24
End: 2024-06-17
Payer: MEDICAID

## 2024-06-17 DIAGNOSIS — S69.81XA OTHER SPECIFIED INJURIES OF RIGHT WRIST, HAND AND FINGER(S), INITIAL ENCOUNTER: ICD-10-CM

## 2024-06-17 PROCEDURE — 99024 POSTOP FOLLOW-UP VISIT: CPT

## 2024-06-17 RX ORDER — MELOXICAM 15 MG/1
15 TABLET ORAL DAILY
Qty: 30 | Refills: 1 | Status: ACTIVE | COMMUNITY
Start: 2024-06-17 | End: 1900-01-01

## 2024-06-17 NOTE — HISTORY OF PRESENT ILLNESS
[de-identified] : 25 days postoperative. [de-identified] : 25 days status post right wrist arthroscopy and TFCC debridement.  Date of surgery: 5/23/2024.  She notes that she has developed swelling and intermittent pain along the dorsal and ulnar aspects of her wrist. She also notes that she developed bruising in those areas as well. She has been wearing a brace and managing her pain with Advil as needed.  [de-identified] : Examination of the right wrist demonstrates her portal incisions to be he indiscriminately she remains tender ulnarly along the TFCC ligament.  Aling well.  There is decreased swelling.  She has full flexion and extension of the digits.  She is neurovascularly intact distally. [de-identified] : Stable, 25 days postoperative. [de-identified] : She was instructed on continue range of motion exercises and scar massage and desensitization. I instructed her to begin therapy. A referral was provided. I also recommended she begin a course of Mobic 15 mg once daily with meals. I warned about potential GI side effects. She will avoid heavy lifting, grasping or twisting. A note was provided for work to refrain from heavy lifting without assistance. She will follow-up in 4 weeks.

## 2024-06-17 NOTE — ADDENDUM
[FreeTextEntry1] : I, Jozef Ascencio, acted solely as a scribe for Dr. Yin on this date on 06/17/2024.

## 2024-06-17 NOTE — END OF VISIT
[FreeTextEntry3] : This note was written by Jozef Ascencio on 06/17/2024 acting solely as a scribe for Dr. Alberto Yin.   All medical record entries made by the Scribe were at my, Dr. Alberto Yin, direction and personally dictated by me on 06/17/2024. I have personally reviewed the chart and agree that the record accurately reflects my personal performance of the history, physical exam, assessment and plan.

## 2024-06-18 ENCOUNTER — NON-APPOINTMENT (OUTPATIENT)
Age: 24
End: 2024-06-18

## 2024-06-19 ENCOUNTER — APPOINTMENT (OUTPATIENT)
Dept: OBGYN | Facility: CLINIC | Age: 24
End: 2024-06-19
Payer: MEDICAID

## 2024-06-19 VITALS
HEIGHT: 66 IN | WEIGHT: 170 LBS | SYSTOLIC BLOOD PRESSURE: 108 MMHG | BODY MASS INDEX: 27.32 KG/M2 | DIASTOLIC BLOOD PRESSURE: 70 MMHG

## 2024-06-19 DIAGNOSIS — L29.9 PRURITUS, UNSPECIFIED: ICD-10-CM

## 2024-06-19 DIAGNOSIS — N89.8 OTHER SPECIFIED NONINFLAMMATORY DISORDERS OF VAGINA: ICD-10-CM

## 2024-06-19 PROCEDURE — 99214 OFFICE O/P EST MOD 30 MIN: CPT

## 2024-06-19 RX ORDER — NYSTATIN 100000 [USP'U]/G
100000 CREAM TOPICAL TWICE DAILY
Qty: 1 | Refills: 0 | Status: ACTIVE | COMMUNITY
Start: 2024-06-19 | End: 1900-01-01

## 2024-06-19 RX ORDER — TRIAMCINOLONE ACETONIDE 5 MG/G
0.5 CREAM TOPICAL TWICE DAILY
Qty: 30 | Refills: 0 | Status: ACTIVE | COMMUNITY
Start: 2024-06-19 | End: 1900-01-01

## 2024-06-19 NOTE — PHYSICAL EXAM
[Labia Majora] : labia major [Labia Minora] : labia minora [Normal] : clitoris [FreeTextEntry4] : blind affirm obtained

## 2024-06-19 NOTE — CHIEF COMPLAINT
[FreeTextEntry1] : Oksana is a 25 y/o G0 who presents today s/p a trip tot [facility] where she states she experienced a tear superior to the clitoral koroma. She states she applied aquaphor to the area and it has improved. She also has c/o b/l vulvar itching and vaginal discharge.

## 2024-06-20 LAB
CANDIDA VAG CYTO: DETECTED
G VAGINALIS+PREV SP MTYP VAG QL MICRO: NOT DETECTED
T VAGINALIS VAG QL WET PREP: NOT DETECTED

## 2024-06-20 RX ORDER — FLUCONAZOLE 150 MG/1
150 TABLET ORAL
Qty: 2 | Refills: 0 | Status: ACTIVE | COMMUNITY
Start: 2024-06-20 | End: 1900-01-01

## 2024-07-02 ENCOUNTER — NON-APPOINTMENT (OUTPATIENT)
Age: 24
End: 2024-07-02

## 2024-07-15 ENCOUNTER — APPOINTMENT (OUTPATIENT)
Dept: ORTHOPEDIC SURGERY | Facility: CLINIC | Age: 24
End: 2024-07-15
Payer: MEDICAID

## 2024-07-15 DIAGNOSIS — S69.81XA OTHER SPECIFIED INJURIES OF RIGHT WRIST, HAND AND FINGER(S), INITIAL ENCOUNTER: ICD-10-CM

## 2024-07-15 PROCEDURE — 99024 POSTOP FOLLOW-UP VISIT: CPT

## 2024-07-17 ENCOUNTER — APPOINTMENT (OUTPATIENT)
Dept: OBGYN | Facility: CLINIC | Age: 24
End: 2024-07-17
Payer: MEDICAID

## 2024-07-17 VITALS
BODY MASS INDEX: 26.52 KG/M2 | WEIGHT: 165 LBS | SYSTOLIC BLOOD PRESSURE: 100 MMHG | HEIGHT: 66 IN | DIASTOLIC BLOOD PRESSURE: 70 MMHG

## 2024-07-17 DIAGNOSIS — N89.8 OTHER SPECIFIED NONINFLAMMATORY DISORDERS OF VAGINA: ICD-10-CM

## 2024-07-17 PROCEDURE — 99214 OFFICE O/P EST MOD 30 MIN: CPT

## 2024-07-18 ENCOUNTER — TRANSCRIPTION ENCOUNTER (OUTPATIENT)
Age: 24
End: 2024-07-18

## 2024-08-07 ENCOUNTER — NON-APPOINTMENT (OUTPATIENT)
Age: 24
End: 2024-08-07

## 2024-08-14 ENCOUNTER — APPOINTMENT (OUTPATIENT)
Dept: OBGYN | Facility: CLINIC | Age: 24
End: 2024-08-14
Payer: MEDICAID

## 2024-08-14 VITALS
WEIGHT: 165 LBS | SYSTOLIC BLOOD PRESSURE: 124 MMHG | DIASTOLIC BLOOD PRESSURE: 80 MMHG | BODY MASS INDEX: 26.52 KG/M2 | HEIGHT: 66 IN

## 2024-08-14 DIAGNOSIS — Z01.411 ENCOUNTER FOR GYNECOLOGICAL EXAMINATION (GENERAL) (ROUTINE) WITH ABNORMAL FINDINGS: ICD-10-CM

## 2024-08-14 DIAGNOSIS — Z12.4 ENCOUNTER FOR SCREENING FOR MALIGNANT NEOPLASM OF CERVIX: ICD-10-CM

## 2024-08-14 DIAGNOSIS — Z30.41 ENCOUNTER FOR SURVEILLANCE OF CONTRACEPTIVE PILLS: ICD-10-CM

## 2024-08-14 DIAGNOSIS — N76.0 ACUTE VAGINITIS: ICD-10-CM

## 2024-08-14 DIAGNOSIS — Z11.3 ENCOUNTER FOR SCREENING FOR INFECTIONS WITH A PREDOMINANTLY SEXUAL MODE OF TRANSMISSION: ICD-10-CM

## 2024-08-14 DIAGNOSIS — B96.89 ACUTE VAGINITIS: ICD-10-CM

## 2024-08-14 DIAGNOSIS — L68.0 HIRSUTISM: ICD-10-CM

## 2024-08-14 PROCEDURE — 99395 PREV VISIT EST AGE 18-39: CPT

## 2024-08-14 NOTE — HISTORY OF PRESENT ILLNESS
[Currently Active] : currently active [Men] : men [TextBox_4] : Oksana is a 25 y/o G0 who presents today for an annual exam with request for POP refill. She states that since starting POPs menses have not been as heavy. She had serum HSV testing 2/2024, +HSV1, borderline positive HSV2. Pt states she will go to outside lab to repeat  if desired.  She also has c/o hair growth around nipples which has been ongoing. Discussed that given COCs caused daily migraines do not want to RX COCs with antiandrogeneric properties but that Spironolactone would be an option. Pt states she will discuss with derm.  She has a history of recurrent vaginal infections. Most recently started on vaginal Clindamycin for BV diagnosed at urgent care and will start extended treatment with Metrogel. She is also taking a probiotic with Lactobacillus - also recommended Caitlin.   [FreeTextEntry3] : OCPs

## 2024-08-14 NOTE — PHYSICAL EXAM
[Appropriately responsive] : appropriately responsive [Alert] : alert [No Acute Distress] : no acute distress [No Lymphadenopathy] : no lymphadenopathy [Oriented x3] : oriented x3 [Examination Of The Breasts] : a normal appearance [No Discharge] : no discharge [No Masses] : no breast masses were palpable [Labia Majora] : normal [Labia Minora] : normal [Normal] : normal [Uterine Adnexae] : normal [FreeTextEntry4] : brown discharge

## 2024-08-14 NOTE — DISCUSSION/SUMMARY
[FreeTextEntry1] : 1) pap/std/affirm obtained 2) pt desires to proceed with extended treatment for Bv: will start Metrogel s/p menses 3) refill for POP issued today 4) discussed Spironolactone as therapy for hirsutism. pt will consider and discuss with derm 5) rx for repeat HSV updated today in event desires to repeat  Return to office in one year, sooner prn

## 2024-08-15 ENCOUNTER — NON-APPOINTMENT (OUTPATIENT)
Age: 24
End: 2024-08-15

## 2024-08-15 LAB
C TRACH RRNA SPEC QL NAA+PROBE: NOT DETECTED
CANDIDA VAG CYTO: NOT DETECTED
G VAGINALIS+PREV SP MTYP VAG QL MICRO: NOT DETECTED
N GONORRHOEA RRNA SPEC QL NAA+PROBE: NOT DETECTED
SOURCE TP AMPLIFICATION: NORMAL
T VAGINALIS VAG QL WET PREP: DETECTED

## 2024-08-20 LAB — CYTOLOGY CVX/VAG DOC THIN PREP: ABNORMAL

## 2024-08-21 ENCOUNTER — APPOINTMENT (OUTPATIENT)
Dept: OBGYN | Facility: CLINIC | Age: 24
End: 2024-08-21
Payer: MEDICAID

## 2024-08-21 VITALS
DIASTOLIC BLOOD PRESSURE: 78 MMHG | HEIGHT: 66 IN | BODY MASS INDEX: 26.52 KG/M2 | WEIGHT: 165 LBS | SYSTOLIC BLOOD PRESSURE: 120 MMHG

## 2024-08-21 DIAGNOSIS — A59.9 TRICHOMONIASIS, UNSPECIFIED: ICD-10-CM

## 2024-08-21 PROCEDURE — 99212 OFFICE O/P EST SF 10 MIN: CPT

## 2024-08-21 NOTE — HISTORY OF PRESENT ILLNESS
[TextBox_4] : Oksana presents today for confirmation testing for +trich noted on affirm and pap from 8/14/24.  She states she has not been sexually active in two months and last affirm prior to the most recent was negative on 7/17/24.

## 2024-08-21 NOTE — PHYSICAL EXAM
[Appropriately responsive] : appropriately responsive [Alert] : alert [No Acute Distress] : no acute distress [No Lymphadenopathy] : no lymphadenopathy [Oriented x3] : oriented x3 [Labia Majora] : normal [Labia Minora] : normal [Normal] : normal [FreeTextEntry4] : thin, white frothy discharge

## 2024-08-26 ENCOUNTER — TRANSCRIPTION ENCOUNTER (OUTPATIENT)
Age: 24
End: 2024-08-26

## 2024-08-26 LAB
A VAGINAE DNA VAG QL NAA+PROBE: NORMAL
BVAB2 DNA VAG QL NAA+PROBE: NORMAL
C KRUSEI DNA VAG QL NAA+PROBE: NEGATIVE
C TRACH RRNA SPEC QL NAA+PROBE: NEGATIVE
CANDIDA DNA VAG QL NAA+PROBE: NEGATIVE
MEGA1 DNA VAG QL NAA+PROBE: NORMAL
N GONORRHOEA RRNA SPEC QL NAA+PROBE: NEGATIVE
T VAGINALIS RRNA SPEC QL NAA+PROBE: POSITIVE

## 2024-08-26 RX ORDER — METRONIDAZOLE 500 MG/1
500 TABLET ORAL TWICE DAILY
Qty: 14 | Refills: 1 | Status: ACTIVE | COMMUNITY
Start: 2024-08-26 | End: 1900-01-01

## 2024-09-26 ENCOUNTER — APPOINTMENT (OUTPATIENT)
Dept: OBGYN | Facility: CLINIC | Age: 24
End: 2024-09-26
Payer: MEDICAID

## 2024-09-26 VITALS
WEIGHT: 167 LBS | HEART RATE: 70 BPM | DIASTOLIC BLOOD PRESSURE: 60 MMHG | HEIGHT: 66 IN | BODY MASS INDEX: 26.84 KG/M2 | SYSTOLIC BLOOD PRESSURE: 110 MMHG | RESPIRATION RATE: 18 BRPM

## 2024-09-26 DIAGNOSIS — N89.8 OTHER SPECIFIED NONINFLAMMATORY DISORDERS OF VAGINA: ICD-10-CM

## 2024-09-26 DIAGNOSIS — A59.9 TRICHOMONIASIS, UNSPECIFIED: ICD-10-CM

## 2024-09-26 DIAGNOSIS — A60.00 HERPESVIRAL INFECTION OF UROGENITAL SYSTEM, UNSPECIFIED: ICD-10-CM

## 2024-09-26 LAB
BILIRUB UR QL STRIP: NORMAL
CLARITY UR: CLEAR
COLLECTION METHOD: NORMAL
GLUCOSE UR-MCNC: NORMAL
HCG UR QL: 1 EU/DL
HGB UR QL STRIP.AUTO: NORMAL
KETONES UR-MCNC: NORMAL
LEUKOCYTE ESTERASE UR QL STRIP: NORMAL
NITRITE UR QL STRIP: NORMAL
PH UR STRIP: 9
PROT UR STRIP-MCNC: NORMAL
SP GR UR STRIP: 1.02

## 2024-09-26 PROCEDURE — 81003 URINALYSIS AUTO W/O SCOPE: CPT | Mod: QW

## 2024-09-26 PROCEDURE — 99214 OFFICE O/P EST MOD 30 MIN: CPT | Mod: 25

## 2024-09-26 RX ORDER — VALACYCLOVIR 500 MG/1
500 TABLET, FILM COATED ORAL
Qty: 90 | Refills: 3 | Status: ACTIVE | COMMUNITY
Start: 2024-09-26 | End: 1900-01-01

## 2024-09-26 NOTE — CHIEF COMPLAINT
[FreeTextEntry1] : Oksana is a 23 y/o G0 who presents today with c/o genital HSV outbreak.  She was treated last month for Trich and also requires QI. she denies being sexually active snce treatment, no longer with partner.

## 2024-09-27 ENCOUNTER — NON-APPOINTMENT (OUTPATIENT)
Age: 24
End: 2024-09-27

## 2024-10-09 ENCOUNTER — APPOINTMENT (OUTPATIENT)
Dept: OBGYN | Facility: CLINIC | Age: 24
End: 2024-10-09
Payer: MEDICAID

## 2024-10-09 VITALS
DIASTOLIC BLOOD PRESSURE: 60 MMHG | BODY MASS INDEX: 26.03 KG/M2 | HEIGHT: 66 IN | WEIGHT: 162 LBS | SYSTOLIC BLOOD PRESSURE: 96 MMHG

## 2024-10-09 DIAGNOSIS — N94.89 OTHER SPECIFIED CONDITIONS ASSOCIATED WITH FEMALE GENITAL ORGANS AND MENSTRUAL CYCLE: ICD-10-CM

## 2024-10-09 LAB
BILIRUB UR QL STRIP: NORMAL
GLUCOSE UR-MCNC: NORMAL
HCG UR QL: 1 EU/DL
HCG UR QL: NEGATIVE
HGB UR QL STRIP.AUTO: NORMAL
KETONES UR-MCNC: NORMAL
LEUKOCYTE ESTERASE UR QL STRIP: NORMAL
NITRITE UR QL STRIP: NORMAL
PH UR STRIP: 7
PROT UR STRIP-MCNC: NORMAL
QUALITY CONTROL: YES
SP GR UR STRIP: 1.02

## 2024-10-09 PROCEDURE — 99214 OFFICE O/P EST MOD 30 MIN: CPT | Mod: 25

## 2024-10-09 PROCEDURE — 81003 URINALYSIS AUTO W/O SCOPE: CPT | Mod: QW

## 2024-10-11 ENCOUNTER — NON-APPOINTMENT (OUTPATIENT)
Age: 24
End: 2024-10-11

## 2024-10-21 ENCOUNTER — TRANSCRIPTION ENCOUNTER (OUTPATIENT)
Age: 24
End: 2024-10-21

## 2024-10-21 RX ORDER — METRONIDAZOLE 7.5 MG/G
0.75 GEL VAGINAL
Qty: 1 | Refills: 0 | Status: ACTIVE | COMMUNITY
Start: 2024-10-21 | End: 1900-01-01

## 2024-11-20 ENCOUNTER — APPOINTMENT (OUTPATIENT)
Dept: OBGYN | Facility: CLINIC | Age: 24
End: 2024-11-20
Payer: MEDICAID

## 2024-11-20 VITALS
HEIGHT: 66 IN | DIASTOLIC BLOOD PRESSURE: 60 MMHG | SYSTOLIC BLOOD PRESSURE: 114 MMHG | HEART RATE: 68 BPM | WEIGHT: 165 LBS | RESPIRATION RATE: 16 BRPM | BODY MASS INDEX: 26.52 KG/M2

## 2024-11-20 DIAGNOSIS — Z11.3 ENCOUNTER FOR SCREENING FOR INFECTIONS WITH A PREDOMINANTLY SEXUAL MODE OF TRANSMISSION: ICD-10-CM

## 2024-11-20 DIAGNOSIS — R35.0 FREQUENCY OF MICTURITION: ICD-10-CM

## 2024-11-20 LAB
BILIRUB UR QL STRIP: NORMAL
GLUCOSE UR-MCNC: NORMAL
HCG UR QL: 0.2 EU/DL
HGB UR QL STRIP.AUTO: NORMAL
KETONES UR-MCNC: NORMAL
LEUKOCYTE ESTERASE UR QL STRIP: NORMAL
NITRITE UR QL STRIP: NORMAL
PH UR STRIP: 8.5
PROT UR STRIP-MCNC: NORMAL
SP GR UR STRIP: 1.02

## 2024-11-20 PROCEDURE — 99214 OFFICE O/P EST MOD 30 MIN: CPT

## 2024-11-20 PROCEDURE — 81003 URINALYSIS AUTO W/O SCOPE: CPT | Mod: QW

## 2024-11-22 ENCOUNTER — TRANSCRIPTION ENCOUNTER (OUTPATIENT)
Age: 24
End: 2024-11-22

## 2024-11-22 LAB
BV BACTERIA RRNA VAG QL NAA+PROBE: NOT DETECTED
C GLABRATA RNA VAG QL NAA+PROBE: NOT DETECTED
C TRACH RRNA SPEC QL NAA+PROBE: NOT DETECTED
CANDIDA RRNA VAG QL PROBE: NOT DETECTED
N GONORRHOEA RRNA SPEC QL NAA+PROBE: NOT DETECTED
T VAGINALIS RRNA SPEC QL NAA+PROBE: NOT DETECTED

## 2024-11-23 ENCOUNTER — RESULT REVIEW (OUTPATIENT)
Age: 24
End: 2024-11-23

## 2024-11-23 ENCOUNTER — OUTPATIENT (OUTPATIENT)
Dept: OUTPATIENT SERVICES | Facility: HOSPITAL | Age: 24
LOS: 1 days | End: 2024-11-23
Payer: COMMERCIAL

## 2024-11-23 ENCOUNTER — APPOINTMENT (OUTPATIENT)
Dept: ULTRASOUND IMAGING | Facility: CLINIC | Age: 24
End: 2024-11-23
Payer: MEDICAID

## 2024-11-23 DIAGNOSIS — Z90.3 ACQUIRED ABSENCE OF STOMACH [PART OF]: Chronic | ICD-10-CM

## 2024-11-23 DIAGNOSIS — R10.2 PELVIC AND PERINEAL PAIN: ICD-10-CM

## 2024-11-23 DIAGNOSIS — Z90.89 ACQUIRED ABSENCE OF OTHER ORGANS: Chronic | ICD-10-CM

## 2024-11-23 PROCEDURE — 76856 US EXAM PELVIC COMPLETE: CPT | Mod: 26

## 2024-11-23 PROCEDURE — 76830 TRANSVAGINAL US NON-OB: CPT

## 2024-11-23 PROCEDURE — 76830 TRANSVAGINAL US NON-OB: CPT | Mod: 26

## 2024-11-23 PROCEDURE — 76856 US EXAM PELVIC COMPLETE: CPT

## 2024-11-26 ENCOUNTER — TRANSCRIPTION ENCOUNTER (OUTPATIENT)
Age: 24
End: 2024-11-26

## 2024-12-18 ENCOUNTER — APPOINTMENT (OUTPATIENT)
Dept: OBGYN | Facility: CLINIC | Age: 24
End: 2024-12-18

## 2024-12-18 VITALS
HEIGHT: 66 IN | BODY MASS INDEX: 26.03 KG/M2 | SYSTOLIC BLOOD PRESSURE: 110 MMHG | DIASTOLIC BLOOD PRESSURE: 70 MMHG | WEIGHT: 162 LBS

## 2024-12-18 DIAGNOSIS — Z11.3 ENCOUNTER FOR SCREENING FOR INFECTIONS WITH A PREDOMINANTLY SEXUAL MODE OF TRANSMISSION: ICD-10-CM

## 2024-12-18 DIAGNOSIS — N89.8 OTHER SPECIFIED NONINFLAMMATORY DISORDERS OF VAGINA: ICD-10-CM

## 2024-12-18 PROCEDURE — ZZZZZ: CPT

## 2024-12-20 ENCOUNTER — TRANSCRIPTION ENCOUNTER (OUTPATIENT)
Age: 24
End: 2024-12-20

## 2025-01-02 ENCOUNTER — NON-APPOINTMENT (OUTPATIENT)
Age: 25
End: 2025-01-02

## 2025-01-10 ENCOUNTER — APPOINTMENT (OUTPATIENT)
Dept: COLORECTAL SURGERY | Facility: CLINIC | Age: 25
End: 2025-01-10
Payer: MEDICAID

## 2025-01-10 VITALS
OXYGEN SATURATION: 100 % | HEART RATE: 93 BPM | RESPIRATION RATE: 15 BRPM | DIASTOLIC BLOOD PRESSURE: 79 MMHG | TEMPERATURE: 98.3 F | WEIGHT: 162 LBS | SYSTOLIC BLOOD PRESSURE: 124 MMHG | HEIGHT: 66 IN | BODY MASS INDEX: 26.03 KG/M2

## 2025-01-10 DIAGNOSIS — M53.3 SACROCOCCYGEAL DISORDERS, NOT ELSEWHERE CLASSIFIED: ICD-10-CM

## 2025-01-10 DIAGNOSIS — R10.2 PELVIC AND PERINEAL PAIN: ICD-10-CM

## 2025-01-10 DIAGNOSIS — K59.00 CONSTIPATION, UNSPECIFIED: ICD-10-CM

## 2025-01-10 PROCEDURE — 99203 OFFICE O/P NEW LOW 30 MIN: CPT | Mod: 25

## 2025-01-10 PROCEDURE — 46600 DIAGNOSTIC ANOSCOPY SPX: CPT

## 2025-01-10 PROCEDURE — A4649 SURGICAL SUPPLIES: CPT

## 2025-01-15 ENCOUNTER — APPOINTMENT (OUTPATIENT)
Dept: OBGYN | Facility: CLINIC | Age: 25
End: 2025-01-15

## 2025-01-15 ENCOUNTER — RESULT REVIEW (OUTPATIENT)
Age: 25
End: 2025-01-15

## 2025-01-15 VITALS
DIASTOLIC BLOOD PRESSURE: 68 MMHG | HEIGHT: 66 IN | WEIGHT: 158 LBS | BODY MASS INDEX: 25.39 KG/M2 | SYSTOLIC BLOOD PRESSURE: 100 MMHG

## 2025-01-15 DIAGNOSIS — N89.8 OTHER SPECIFIED NONINFLAMMATORY DISORDERS OF VAGINA: ICD-10-CM

## 2025-01-15 DIAGNOSIS — Z11.3 ENCOUNTER FOR SCREENING FOR INFECTIONS WITH A PREDOMINANTLY SEXUAL MODE OF TRANSMISSION: ICD-10-CM

## 2025-01-15 LAB
HCG UR QL: NEGATIVE
QUALITY CONTROL: YES

## 2025-01-15 PROCEDURE — 99214 OFFICE O/P EST MOD 30 MIN: CPT

## 2025-01-15 PROCEDURE — 81025 URINE PREGNANCY TEST: CPT

## 2025-01-16 ENCOUNTER — APPOINTMENT (OUTPATIENT)
Dept: CT IMAGING | Facility: CLINIC | Age: 25
End: 2025-01-16
Payer: MEDICAID

## 2025-01-16 ENCOUNTER — OUTPATIENT (OUTPATIENT)
Dept: OUTPATIENT SERVICES | Facility: HOSPITAL | Age: 25
LOS: 1 days | End: 2025-01-16
Payer: COMMERCIAL

## 2025-01-16 DIAGNOSIS — Z90.89 ACQUIRED ABSENCE OF OTHER ORGANS: Chronic | ICD-10-CM

## 2025-01-16 DIAGNOSIS — Z90.3 ACQUIRED ABSENCE OF STOMACH [PART OF]: Chronic | ICD-10-CM

## 2025-01-16 DIAGNOSIS — K59.00 CONSTIPATION, UNSPECIFIED: ICD-10-CM

## 2025-01-16 PROCEDURE — 72193 CT PELVIS W/DYE: CPT | Mod: 26

## 2025-01-16 PROCEDURE — 72193 CT PELVIS W/DYE: CPT

## 2025-01-17 ENCOUNTER — TRANSCRIPTION ENCOUNTER (OUTPATIENT)
Age: 25
End: 2025-01-17

## 2025-01-20 ENCOUNTER — NON-APPOINTMENT (OUTPATIENT)
Age: 25
End: 2025-01-20

## 2025-01-20 ENCOUNTER — APPOINTMENT (OUTPATIENT)
Dept: COLORECTAL SURGERY | Facility: CLINIC | Age: 25
End: 2025-01-20
Payer: MEDICAID

## 2025-01-20 DIAGNOSIS — M53.3 SACROCOCCYGEAL DISORDERS, NOT ELSEWHERE CLASSIFIED: ICD-10-CM

## 2025-01-20 PROCEDURE — 99212 OFFICE O/P EST SF 10 MIN: CPT | Mod: 93

## 2025-02-04 ENCOUNTER — TRANSCRIPTION ENCOUNTER (OUTPATIENT)
Age: 25
End: 2025-02-04

## 2025-02-10 ENCOUNTER — APPOINTMENT (OUTPATIENT)
Dept: OBGYN | Facility: CLINIC | Age: 25
End: 2025-02-10
Payer: MEDICAID

## 2025-02-10 VITALS
DIASTOLIC BLOOD PRESSURE: 70 MMHG | HEIGHT: 66 IN | WEIGHT: 160 LBS | BODY MASS INDEX: 25.71 KG/M2 | SYSTOLIC BLOOD PRESSURE: 100 MMHG

## 2025-02-10 DIAGNOSIS — N92.6 IRREGULAR MENSTRUATION, UNSPECIFIED: ICD-10-CM

## 2025-02-10 DIAGNOSIS — N89.8 OTHER SPECIFIED NONINFLAMMATORY DISORDERS OF VAGINA: ICD-10-CM

## 2025-02-10 LAB
HCG UR QL: NEGATIVE
QUALITY CONTROL: YES

## 2025-02-10 PROCEDURE — 99214 OFFICE O/P EST MOD 30 MIN: CPT

## 2025-02-10 PROCEDURE — 81025 URINE PREGNANCY TEST: CPT

## 2025-02-11 ENCOUNTER — TRANSCRIPTION ENCOUNTER (OUTPATIENT)
Age: 25
End: 2025-02-11

## 2025-02-11 LAB
BV BACTERIA RRNA VAG QL NAA+PROBE: DETECTED
C GLABRATA RNA VAG QL NAA+PROBE: NOT DETECTED
C TRACH RRNA SPEC QL NAA+PROBE: NOT DETECTED
CANDIDA RRNA VAG QL PROBE: DETECTED
N GONORRHOEA RRNA SPEC QL NAA+PROBE: NOT DETECTED
T VAGINALIS RRNA SPEC QL NAA+PROBE: NOT DETECTED

## 2025-02-11 RX ORDER — FLUCONAZOLE 150 MG/1
150 TABLET ORAL
Qty: 2 | Refills: 0 | Status: ACTIVE | COMMUNITY
Start: 2025-02-11 | End: 1900-01-01

## 2025-02-11 RX ORDER — METRONIDAZOLE 7.5 MG/G
0.75 GEL VAGINAL
Qty: 1 | Refills: 0 | Status: ACTIVE | COMMUNITY
Start: 2025-02-11 | End: 1900-01-01

## 2025-02-12 ENCOUNTER — TRANSCRIPTION ENCOUNTER (OUTPATIENT)
Age: 25
End: 2025-02-12

## 2025-02-13 ENCOUNTER — TRANSCRIPTION ENCOUNTER (OUTPATIENT)
Age: 25
End: 2025-02-13

## 2025-03-05 ENCOUNTER — APPOINTMENT (OUTPATIENT)
Dept: OBGYN | Facility: CLINIC | Age: 25
End: 2025-03-05

## 2025-03-05 VITALS
HEIGHT: 66 IN | BODY MASS INDEX: 25.71 KG/M2 | HEART RATE: 80 BPM | SYSTOLIC BLOOD PRESSURE: 116 MMHG | RESPIRATION RATE: 18 BRPM | DIASTOLIC BLOOD PRESSURE: 68 MMHG | WEIGHT: 160 LBS

## 2025-03-05 DIAGNOSIS — Z11.3 ENCOUNTER FOR SCREENING FOR INFECTIONS WITH A PREDOMINANTLY SEXUAL MODE OF TRANSMISSION: ICD-10-CM

## 2025-03-05 PROCEDURE — 99214 OFFICE O/P EST MOD 30 MIN: CPT

## 2025-03-05 PROCEDURE — 36415 COLL VENOUS BLD VENIPUNCTURE: CPT

## 2025-03-06 ENCOUNTER — TRANSCRIPTION ENCOUNTER (OUTPATIENT)
Age: 25
End: 2025-03-06

## 2025-03-06 LAB
BV BACTERIA RRNA VAG QL NAA+PROBE: NOT DETECTED
C GLABRATA RNA VAG QL NAA+PROBE: NOT DETECTED
C TRACH RRNA SPEC QL NAA+PROBE: NOT DETECTED
CANDIDA RRNA VAG QL PROBE: DETECTED
N GONORRHOEA RRNA SPEC QL NAA+PROBE: NOT DETECTED
T PALLIDUM AB SER QL IA: NEGATIVE
T VAGINALIS RRNA SPEC QL NAA+PROBE: NOT DETECTED

## 2025-03-06 RX ORDER — FLUCONAZOLE 150 MG/1
150 TABLET ORAL
Qty: 2 | Refills: 0 | Status: ACTIVE | COMMUNITY
Start: 2025-03-06 | End: 1900-01-01

## 2025-03-07 ENCOUNTER — TRANSCRIPTION ENCOUNTER (OUTPATIENT)
Age: 25
End: 2025-03-07

## 2025-03-07 LAB
HAV IGM SER QL: NONREACTIVE
HBV CORE IGM SER QL: NONREACTIVE
HBV SURFACE AG SER QL: NONREACTIVE
HCV AB SER QL: NONREACTIVE
HCV S/CO RATIO: 0.15 S/CO
HIV1+2 AB SPEC QL IA.RAPID: NONREACTIVE

## 2025-03-09 LAB
HSV 1 IGG TYPE-SPECIFIC AB: 55 INDEX
HSV 2 IGG TYPE-SPECIFIC AB: <0.9 INDEX

## 2025-04-02 ENCOUNTER — RX RENEWAL (OUTPATIENT)
Age: 25
End: 2025-04-02

## 2025-04-14 NOTE — ASU PREOP CHECKLIST - NSWEIGHTCALCTOOLDRUG_GEN_A_CORE
Stat Farxiga 10 mg once daily every morning    Switch to brown rice.  Hard boiled egg    Call [facility] customer service and ask:  -what is my medication/prescription deductible?  -How much of my deductible have I met?  -how much will Farxiga and Ozempic cost after I meet my deductible?  -Can I enroll in a medicare payment plan?  
 used

## 2025-04-23 ENCOUNTER — APPOINTMENT (OUTPATIENT)
Dept: OBGYN | Facility: CLINIC | Age: 25
End: 2025-04-23
Payer: MEDICAID

## 2025-04-23 VITALS
WEIGHT: 156 LBS | SYSTOLIC BLOOD PRESSURE: 110 MMHG | HEIGHT: 66 IN | BODY MASS INDEX: 25.07 KG/M2 | DIASTOLIC BLOOD PRESSURE: 70 MMHG

## 2025-04-23 DIAGNOSIS — N89.8 OTHER SPECIFIED NONINFLAMMATORY DISORDERS OF VAGINA: ICD-10-CM

## 2025-04-23 LAB
APPEARANCE: CLEAR
BILIRUBIN URINE: NEGATIVE
BLOOD URINE: NEGATIVE
COLOR: YELLOW
GLUCOSE QUALITATIVE U: NEGATIVE
KETONES URINE: NEGATIVE
LEUKOCYTE ESTERASE URINE: NEGATIVE
NITRITE URINE: NEGATIVE
PH URINE: 6.5
PROTEIN URINE: ABNORMAL
SPECIFIC GRAVITY URINE: 1.02
UROBILINOGEN URINE: 0.2 (ref 0.2–?)

## 2025-04-23 PROCEDURE — 99214 OFFICE O/P EST MOD 30 MIN: CPT

## 2025-04-23 PROCEDURE — 81003 URINALYSIS AUTO W/O SCOPE: CPT | Mod: QW

## 2025-04-24 ENCOUNTER — TRANSCRIPTION ENCOUNTER (OUTPATIENT)
Age: 25
End: 2025-04-24

## 2025-05-28 ENCOUNTER — TRANSCRIPTION ENCOUNTER (OUTPATIENT)
Age: 25
End: 2025-05-28

## 2025-05-28 RX ORDER — METRONIDAZOLE 7.5 MG/G
0.75 GEL VAGINAL
Qty: 1 | Refills: 0 | Status: ACTIVE | COMMUNITY
Start: 2025-05-28 | End: 1900-01-01

## 2025-05-28 RX ORDER — FLUCONAZOLE 150 MG/1
150 TABLET ORAL
Qty: 2 | Refills: 0 | Status: ACTIVE | COMMUNITY
Start: 2025-05-28 | End: 1900-01-01

## 2025-06-09 ENCOUNTER — RX RENEWAL (OUTPATIENT)
Age: 25
End: 2025-06-09

## 2025-06-09 RX ORDER — NORETHINDRONE 0.35 MG/1
0.35 TABLET ORAL
Qty: 84 | Refills: 0 | Status: ACTIVE | COMMUNITY
Start: 2025-06-09 | End: 1900-01-01

## 2025-08-05 ENCOUNTER — TRANSCRIPTION ENCOUNTER (OUTPATIENT)
Age: 25
End: 2025-08-05

## 2025-08-18 ENCOUNTER — APPOINTMENT (OUTPATIENT)
Dept: OBGYN | Facility: CLINIC | Age: 25
End: 2025-08-18
Payer: COMMERCIAL

## 2025-08-18 VITALS
HEIGHT: 66 IN | DIASTOLIC BLOOD PRESSURE: 70 MMHG | WEIGHT: 165 LBS | SYSTOLIC BLOOD PRESSURE: 114 MMHG | BODY MASS INDEX: 26.52 KG/M2

## 2025-08-18 DIAGNOSIS — N89.8 OTHER SPECIFIED NONINFLAMMATORY DISORDERS OF VAGINA: ICD-10-CM

## 2025-08-18 DIAGNOSIS — N94.89 OTHER SPECIFIED CONDITIONS ASSOCIATED WITH FEMALE GENITAL ORGANS AND MENSTRUAL CYCLE: ICD-10-CM

## 2025-08-18 DIAGNOSIS — L68.0 HIRSUTISM: ICD-10-CM

## 2025-08-18 LAB
APPEARANCE: CLEAR
BILIRUBIN URINE: NEGATIVE
BLOOD URINE: ABNORMAL
COLOR: YELLOW
GLUCOSE QUALITATIVE U: NEGATIVE
HCG UR QL: NEGATIVE
KETONES URINE: NEGATIVE
LEUKOCYTE ESTERASE URINE: NEGATIVE
NITRITE URINE: NEGATIVE
PH URINE: 5.5
PROTEIN URINE: NEGATIVE
QUALITY CONTROL: YES
SPECIFIC GRAVITY URINE: 1.02
UROBILINOGEN URINE: 0.2 (ref 0.2–?)

## 2025-08-18 PROCEDURE — 99214 OFFICE O/P EST MOD 30 MIN: CPT

## 2025-08-18 RX ORDER — METRONIDAZOLE 7.5 MG/G
0.75 GEL VAGINAL
Qty: 1 | Refills: 0 | Status: DISCONTINUED | COMMUNITY
Start: 2025-08-05 | End: 2025-08-18

## 2025-08-18 RX ORDER — FLUCONAZOLE 150 MG/1
150 TABLET ORAL
Qty: 2 | Refills: 0 | Status: DISCONTINUED | COMMUNITY
Start: 2025-08-05 | End: 2025-08-18

## 2025-08-20 ENCOUNTER — TRANSCRIPTION ENCOUNTER (OUTPATIENT)
Age: 25
End: 2025-08-20

## 2025-09-02 ENCOUNTER — RX RENEWAL (OUTPATIENT)
Age: 25
End: 2025-09-02

## 2025-09-02 ENCOUNTER — TRANSCRIPTION ENCOUNTER (OUTPATIENT)
Age: 25
End: 2025-09-02

## (undated) DEVICE — PACK UPPER EXTREMITY

## (undated) DEVICE — NDL HYPO SAFE 22G X 1.5" (BLACK)

## (undated) DEVICE — SHAVER BLADE GATOR 2.9MM

## (undated) DEVICE — SHAVER BLADE LINVATEC FULL RADIUS RESECTOR 2.9MM SM JOINT

## (undated) DEVICE — DRSG WEBRIL 3"

## (undated) DEVICE — DRAPE SURGICAL #1010

## (undated) DEVICE — WARMING BLANKET LOWER ADULT

## (undated) DEVICE — S&N ARTHROCARE WAND COBLATION MICROBLATOR 30 ICW

## (undated) DEVICE — TUBING DEPUY MITEK FMS OUTFLOW

## (undated) DEVICE — GLV 7.5 PROTEXIS (BLUE)

## (undated) DEVICE — DRAPE 3/4 SHEET 52X76"

## (undated) DEVICE — TUBING SUCTION 20FT

## (undated) DEVICE — SOL IRR BAG NS 0.9% 3000ML

## (undated) DEVICE — NDL HYPO REGULAR BEVEL 25G X 1.5" (BLUE)

## (undated) DEVICE — TUBING DEPUY MITEK FMS VUE INFLOW

## (undated) DEVICE — TOURNIQUET CUFF 18" DUAL PORT SINGLE BLADDER W PLC  (BLACK)

## (undated) DEVICE — SUT MONOSOF 5-0 18" P-13

## (undated) DEVICE — SUT MONOCRYL 5-0 18" P-3 UNDYED

## (undated) DEVICE — DRSG KLING 4"

## (undated) DEVICE — TOURNIQUET ESMARK 4"

## (undated) DEVICE — NDL HYPO SAFE 20G X 1" (YELLOW)

## (undated) DEVICE — DURABLE MEDICAL EQUIPMENT: Type: DURABLE MEDICAL EQUIPMENT

## (undated) DEVICE — BLADE SCALPEL SAFETYLOCK #11

## (undated) DEVICE — DRSG STERISTRIPS 0.5 X 4"

## (undated) DEVICE — VENODYNE/SCD SLEEVE CALF MEDIUM

## (undated) DEVICE — DRSG ACE BANDAGE 4"

## (undated) DEVICE — DRAPE TOWEL BLUE 17" X 24"

## (undated) DEVICE — GLV 7 PROTEXIS (WHITE)

## (undated) DEVICE — POSITIONER FOAM HEAD CRADLE (PINK)

## (undated) DEVICE — LINVATEC FINGER TRAP SMALL

## (undated) DEVICE — POSITIONER STRAP ARMBOARD VELCRO TS-30